# Patient Record
Sex: FEMALE | Race: WHITE | NOT HISPANIC OR LATINO | Employment: FULL TIME | URBAN - METROPOLITAN AREA
[De-identification: names, ages, dates, MRNs, and addresses within clinical notes are randomized per-mention and may not be internally consistent; named-entity substitution may affect disease eponyms.]

---

## 2017-10-01 ENCOUNTER — TRANSFERRED RECORDS (OUTPATIENT)
Dept: HEALTH INFORMATION MANAGEMENT | Facility: CLINIC | Age: 32
End: 2017-10-01

## 2017-10-27 ENCOUNTER — TRANSFERRED RECORDS (OUTPATIENT)
Dept: HEALTH INFORMATION MANAGEMENT | Facility: CLINIC | Age: 32
End: 2017-10-27

## 2017-10-27 LAB — PAP SMEAR - HIM PATIENT REPORTED: NEGATIVE

## 2018-05-24 ENCOUNTER — TRANSFERRED RECORDS (OUTPATIENT)
Dept: HEALTH INFORMATION MANAGEMENT | Facility: CLINIC | Age: 33
End: 2018-05-24

## 2018-05-24 LAB
HBV SURFACE AG SERPL QL IA: NORMAL
RUBELLA ABY IGG: 7.71
RUBELLA ANTIBODY IGG QUANTITATIVE: NORMAL IU/ML

## 2018-06-21 ENCOUNTER — TRANSFERRED RECORDS (OUTPATIENT)
Dept: HEALTH INFORMATION MANAGEMENT | Facility: CLINIC | Age: 33
End: 2018-06-21

## 2018-07-20 ENCOUNTER — PRE VISIT (OUTPATIENT)
Dept: MATERNAL FETAL MEDICINE | Facility: CLINIC | Age: 33
End: 2018-07-20

## 2018-07-27 ENCOUNTER — OFFICE VISIT (OUTPATIENT)
Dept: MATERNAL FETAL MEDICINE | Facility: CLINIC | Age: 33
End: 2018-07-27
Attending: OBSTETRICS & GYNECOLOGY
Payer: COMMERCIAL

## 2018-07-27 ENCOUNTER — HOSPITAL ENCOUNTER (OUTPATIENT)
Dept: ULTRASOUND IMAGING | Facility: CLINIC | Age: 33
Discharge: HOME | End: 2018-07-27
Attending: OBSTETRICS & GYNECOLOGY | Admitting: OBSTETRICS & GYNECOLOGY
Payer: COMMERCIAL

## 2018-07-27 DIAGNOSIS — O26.90 PREGNANCY RELATED CONDITION, ANTEPARTUM: ICD-10-CM

## 2018-07-27 DIAGNOSIS — O09.522 ELDERLY MULTIGRAVIDA IN SECOND TRIMESTER: Primary | ICD-10-CM

## 2018-07-27 DIAGNOSIS — O09.522 ADVANCED MATERNAL AGE IN MULTIGRAVIDA, SECOND TRIMESTER: Primary | ICD-10-CM

## 2018-07-27 PROCEDURE — 96040 ZZH GENETIC COUNSELING, EACH 30 MINUTES: CPT | Mod: ZF | Performed by: GENETIC COUNSELOR, MS

## 2018-07-27 PROCEDURE — 76811 OB US DETAILED SNGL FETUS: CPT

## 2018-07-27 NOTE — PROGRESS NOTES
"Please see \"Imaging\" tab under \"Chart Review\" for details of today's US at the [facility].    Joao Monaco MD  Maternal-Fetal Medicine    "

## 2018-07-27 NOTE — PROGRESS NOTES
[facility]  Genetic Counseling Consult    Patient:  Maryam Grier YOB: 1985   Date of Service: 2018 Referring Provider:  Dr. Laura Tatum     Maryam Grier was seen at the [facility] for genetic counseling consultation as part of her appointment for comprehensive ultrasound.  The indication for genetic counseling was advanced maternal age.  She was accompanied to clinic by her  and her 8 year old and 5 year old sons.       Summary/Plan:     1.   At age 33 at delivery, the risk for a woman to have a baby at birth with a chromosome problem is about 0.5%. Maryam had a normal NIPT analysis earlier in pregnancy through her local prenatal care provider's office. This normal result would be expected to significantly reduce the risk of a chromosome problem in this pregnancy.    2.  Prior to today's ultrasound, I met with Maryam to review pregnancy and family history.   We also reviewed her previous screening/testing results and talked about what additional information might be provided by today's ultrasound.  See details below.    3.  After our conversation, Maryam had a comprehensive (level II) ultrasound today.  Please see the ultrasound report for further details.    4.  Maryam reports that she is not interested in in invasive prenatal testing for fetal chromosome abnormalities at this time.    Pregnancy History:   /Parity:    Age at Delivery: 33 year old  MARYAM: 2018, by Ultrasound  Gestational Age: 18w5d    Maryam reports that she takes prenatal vitamins and Synthroid (for hypothyroidism).      Per her prenatal record, Maryam had a 9 week ultrasound that showed fetal growth 6 days behind was was predicted by her LMP date.  Maryam reports that her primary prenatal care provider has decided to use this ultrasound-based MARYAM for her pregnancy.       Maryam reports no complications with her two previous  "pregnancies.    Family History:     A four-generation pedigree was obtained and will be scanned in under the  Media  tab in EPIC. The following significant findings were reported by Maryam and her :      Family history of learning differences:  Maryam and her  report that both of their sons have Individualized Education Programs (IEPs) for school.  They report that their oldest son has been diagnosed with a \"high functioning\" autism spectrum disorder.  They report that their younger son reeives PT, OT, and speech therapy services but that his underlying learning/developmental differences have not been specifically categorized.  Maryam reports that they had been offered genetic testing/genetic evaluation previously for their sons, but she reports that they declined to pursue genetic assessment because she said \"they didn't see what difference it would make.\"  We very briefly talked about some of the reasons information from genetic assessment can be helpful, including offering potential anticipatory guidance if a specific diagnosis is made.  Maryam said they might think about pursuing a genetics assessment in the future.    We talked about that this family history likely increases the risk for similar learning/developmental issues in this coming child.  We did talk about that this family history would be important for their children's health care providers/teachers to be aware of, so that appropriate screening and monitoring can be done for potential learning issues.  (Maryam and her  stated that they were already aware of this information.)      Family history of skin defect:  Maryam and her  report that their oldest son had a \"pinhole\" on his back note after birth but that spinal imaging showed no abnormalities.  They report that this did not require any treatment, and they were not aware of a genetic concern regarding this finding.    Family history of cancer:  Maryam reports that she has a " maternal aunt that had breast cancer in her 30s.  She report that her maternal grandfather  of a brain tumor.  In addition, Maryam reports that her father had a benign stomach tumor removed and that she has a paternal aunt with mouth cancer (past history of smoking).  We talked about that some families with a history of early onset cancer have a hereditary risk factor in the family that is increasing the risk of some cancers in the family.  We talked about that there are family cancer clinics available for individuals who want more specific genetic counseling about their family history of cancer and about additional screening and testing recommendations that might be made based on this history.  She reports that she is not aware of anybody in the family having genetic counseling or genetic testing with respect to this issue.      Maryam's partner's family history of melanoma:  Maryam's  reports that his mother has had melanoma twice and that his maternal grandfather also had melanoma twice.  We talked about that there are some genetic risk factors that can be associated with an increased incidence of melanoma in some famiies, but Maryam's  was not aware of any specific genetic risk factor being identified related to this history.  I strongly recommended that Maryam's  have a regular (at least yearly) skin exams through his primary doctor or a dermatologist because of this family history.     Otherwise, the reported family history is negative for multiple miscarriages, stillbirths, birth defects, known genetic conditions, and consanguinity.       Carrier Screening:       Maryam and her  both report that they are of  ancestry.  Cystic fibrosis is an autosomal recessive genetic condition that occurs with increased frequency in individuals of  ancestry and carrier screening for this condition is available.  In addition,  screening in the [facility] includes cystic  fibrosis.    In addition to ethnic-based carrier screening, expanded carrier screening for mutations in a large panel of genes associated with autosomal recessive conditions (including cystic fibrosis, spinal muscular atrophy, and others) and X-linked recessive conditions (like Fragile X syndrome) is now available      Carrier screening was not discussed in detail today.  If  Maryam decides that she would like to have cystic fibrosis or other carrier screening in the future, I would be happy to help facilitate this, if needed.       Risk Assessment for Chromosome Conditions:       We talked about that the risk for fetal chromosome abnormalities increases with maternal age. We briefly discussed specific features of common chromosome abnormalities, including Down syndrome, trisomy 13, trisomy 18, and sex chromosome trisomies.      At age 33 at delivery, the risk to have a baby with any chromosome abnormality at birth is about 1 in 202.   (At age 32 at midtrimester, the risk to have a baby with any chromosome abnormality at midtrimester is about 1 in  172.)      Maryam had maternal serum-based screening earlier in pregnancy.  See below for summary of her test results:     Non-invasive Prenatal Testing (NIPT)  - This test involves measurement of cell-free DNA testing, which is of both maternal and placental/fetal origin.  - This test screens for fetal trisomy 21, trisomy 13, trisomy 18, and sex chromosome aneuploidy.  - While this test is thought to be highly specific/sensitive with respect to screening for trisomy 21, trisomy 13, and trisomy 18, rare false positives and false negatives do occur. There is still limited data about the exact sensitivity/specificity of this test with respect to screening for sex chromosome aneuploidy.    Maryam had a InformaSeq test earlier in pregnancy; we reviewed the results today, which are normal for chromosome 13, chromosome 18 and chromosome 21 (no aneuploidy detected).  It was also  screen negative for fetal sex chromosome abnormalities.    First trimester ultrasound with nuchal translucency and nasal bone assessment was not performed in this pregnancy, to our knowledge.     We talked about that these normal results for chromosomes 21, 13, and 18 likely very significantly reduces the chance of Down syndrome, Trisomy 13, and Trisomy 18 in this pregnancy. However, we talked about that there are rare false negatives that can occur with this test.    The results were reported to be consistent with a female (XX) fetal gender. Maryam reports being aware of this gender information.  - This test cannot screen for open neural tube defects, and so consideration of maternal serum AFP 15 weeks or later is recommended.       Testing Options:       We reviewed the benefits and limitations of screening and diagnostic tests:    - We talked about that screening tests provide a risk assessment specific to the pregnancy for certain fetal chromosome abnormalities, but cannot definitively diagnose or exclude a fetal chromosome abnormality. Follow-up genetic counseling and consideration of diagnostic testing is recommended with any abnormal screening result.     - We discussed that diagnostic tests are associated with a risk of miscarriage. These tests can detect fetal chromosome abnormalities with greater than 99% certainty. Results can rarely be complicated by maternal cell contamination or mosaicism and are limited by the resolution of cytogenetic G-banding technology.     -There is no screening nor diagnostic test that can detect all forms of birth defects or mental disability.      We discussed the following screening and testing options:     Genetic Amniocentesis  - This is an invasive procedure typically performed at 15 weeks or later, through which amniotic fluid is obtained for the purpose of chromosome analysis and/or other prenatal genetic analysis.  - Amniocentesis is considered a diagnostic test for  chromosome problems during pregnancy.  - The risk of pregnancy loss associated with amniocentesis is generally estimated to be 1/500 or less.  - Amniotic fluid AFP measurement is also done to screen for the possibility of open neural tube or ventral defects.     Comprehensive (Level II) ultrasound  - This detailed ultrasound is usually performed between 18-22 weeks gestation to screen for major birth defects.  - It also screens for ultrasound markers that might increase the risk for a chromosome problem in a pregnancy.    Face-to-face time of the genetic counseling consult was 35 minutes.    Anne Vital MS, [facility]  Genetic Counselor  Ph: [phone]  Pager: [phone]

## 2018-07-27 NOTE — MR AVS SNAPSHOT
"              After Visit Summary   2018    Maryam Grier    MRN: [id number]           Patient Information     Date Of Birth          1985        Visit Information        Provider Department      2018 10:00 AM Joao Monaco MD [facility] Maternal Fetal Medicine [facility]        Today's Diagnoses     Elderly multigravida in second trimester    -  1       Follow-ups after your visit        Who to contact     If you have questions or need follow up information about today's clinic visit or your schedule please contact [facility] directly at [phone].  Normal or non-critical lab and imaging results will be communicated to you by [employer]art, letter or phone within 4 business days after the clinic has received the results. If you do not hear from us within 7 days, please contact the clinic through [employer] or phone. If you have a critical or abnormal lab result, we will notify you by phone as soon as possible.  Submit refill requests through [employer] or call your pharmacy and they will forward the refill request to us. Please allow 3 business days for your refill to be completed.          Additional Information About Your Visit        MyChart Information     [employer] lets you send messages to your doctor, view your test results, renew your prescriptions, schedule appointments and more. To sign up, go to [email or web address] . Click on \"Log in\" on the left side of the screen, which will take you to the Welcome page. Then click on \"Sign up Now\" on the right side of the page.     You will be asked to enter the access code listed below, as well as some personal information. Please follow the directions to create your username and password.     Your access code is: [email or web address]  Expires: 10/25/2018 10:07 AM     Your access code will  in 90 days. If you need help or a new code, please call your [place] clinic or [phone].        Care EveryWhere ID     This is your " Care EveryWhere ID. This could be used by other organizations to access your [place] medical records  [id number]        Your Vitals Were     Last Period                   03/12/2018            Blood Pressure from Last 3 Encounters:   No data found for BP    Weight from Last 3 Encounters:   No data found for Wt              Today, you had the following     No orders found for display       Primary Care Provider Fax #    Physician No Ref-Primary [phone]       No address on file        Equal Access to Services     MARIE DENIS : Hadii aad ku hadasho Soomaali, waaxda luqadaha, qaybta kaalmada adeegyada, waxay gustavoin isaacn mary bethevan cedillo laPiasachin . So [facility] [phone].    ATENCIÓN: Si habla español, tiene a landis disposición servicios gratuitos de asistencia lingüística. Llame al [phone].    We comply with applicable federal civil rights laws and [place] laws. We do not discriminate on the basis of race, color, national origin, age, disability, sex, sexual orientation, or gender identity.            Thank you!     Thank you for choosing [facility]  for your care. Our goal is always to provide you with excellent care. Hearing back from our patients is one way we can continue to improve our services. Please take a few minutes to complete the written survey that you may receive in the mail after your visit with us. Thank you!             Your Updated Medication List - Protect others around you: Learn how to safely use, store and throw away your medicines at www.disposemymeds.org.      Notice  As of 7/27/2018 10:07 AM    You have not been prescribed any medications.

## 2018-07-27 NOTE — MR AVS SNAPSHOT
"              After Visit Summary   2018    Maryam Grier    MRN: [id number]           Patient Information     Date Of Birth          1985        Visit Information        Provider Department      2018 8:45 AM Michelle Vital GC [facility] Maternal Fetal Medicine [facility]        Today's Diagnoses     Advanced maternal age in multigravida, second trimester    -  1    Pregnancy related condition, antepartum           Follow-ups after your visit        Who to contact     If you have questions or need follow up information about today's clinic visit or your schedule please contact [facility]ET[facility]IC[facility] directly at [phone].  Normal or non-critical lab and imaging results will be communicated to you by MyChart, letter or phone within 4 business days after the clinic has received the results. If you do not hear from us within 7 days, please contact the clinic through [employer] or phone. If you have a critical or abnormal lab result, we will notify you by phone as soon as possible.  Submit refill requests through [employer] or call your pharmacy and they will forward the refill request to us. Please allow 3 business days for your refill to be completed.          Additional Information About Your Visit        MyChart Information     [employer] lets you send messages to your doctor, view your test results, renew your prescriptions, schedule appointments and more. To sign up, go to [email or web address] . Click on \"Log in\" on the left side of the screen, which will take you to the Welcome page. Then click on \"Sign up Now\" on the right side of the page.     You will be asked to enter the access code listed below, as well as some personal information. Please follow the directions to create your username and password.     Your access code is: [email or web address]  Expires: 10/25/2018 10:07 AM     Your access code will  in 90 days. If you need help or a new code, please call your [place] clinic or " [phone].        Care EveryWhere ID     This is your Care EveryWhere ID. This could be used by other organizations to access your [place] medical records  [id number]        Your Vitals Were     Last Period                   03/12/2018            Blood Pressure from Last 3 Encounters:   No data found for BP    Weight from Last 3 Encounters:   No data found for Wt              We Performed the Following     [facility] Genetic Counseling        Primary Care Provider Fax #    Physician No Ref-Primary [phone]       No address on file        Equal Access to Services     TAMMY DENIS : Hadii aad ku hadasho Soomaali, waaxda luqadaha, qaybta kaalmada adeegyada, waxay idiin hayaan adeeg michaelshanon laoxana . So [facility] [phone].    ATENCIÓN: Si habla español, tiene a landis disposición servicios gratuitos de asistencia lingüística. Llame al [phone].    We comply with applicable federal civil rights laws and [place] laws. We do not discriminate on the basis of race, color, national origin, age, disability, sex, sexual orientation, or gender identity.            Thank you!     Thank you for choosing [facility]  for your care. Our goal is always to provide you with excellent care. Hearing back from our patients is one way we can continue to improve our services. Please take a few minutes to complete the written survey that you may receive in the mail after your visit with us. Thank you!             Your Updated Medication List - Protect others around you: Learn how to safely use, store and throw away your medicines at www.disposemymeds.org.      Notice  As of 7/27/2018 11:28 AM    You have not been prescribed any medications.

## 2018-11-06 LAB — GROUP B STREP PCR: NORMAL

## 2018-12-03 ENCOUNTER — HOSPITAL ENCOUNTER (OUTPATIENT)
Facility: CLINIC | Age: 33
Discharge: HOME | End: 2018-12-03
Attending: OBSTETRICS & GYNECOLOGY | Admitting: OBSTETRICS & GYNECOLOGY
Payer: COMMERCIAL

## 2018-12-03 VITALS
HEIGHT: 66 IN | RESPIRATION RATE: 18 BRPM | SYSTOLIC BLOOD PRESSURE: 132 MMHG | TEMPERATURE: 98.4 F | DIASTOLIC BLOOD PRESSURE: 77 MMHG

## 2018-12-03 PROCEDURE — G0463 HOSPITAL OUTPT CLINIC VISIT: HCPCS

## 2018-12-03 RX ORDER — LIDOCAINE 40 MG/G
CREAM TOPICAL
Status: DISCONTINUED | OUTPATIENT
Start: 2018-12-03 | End: 2018-12-03 | Stop reason: HOSPADM

## 2018-12-03 NOTE — PROGRESS NOTES
Patient presented for external cephalic version for breech presentation. Bedside ultrasound prior to procedure revealed cephalic presentation, occiput anterior. Reactive NST. Discharged home.     Laura Tatum MD

## 2018-12-03 NOTE — IP AVS SNAPSHOT
MRN:[id number]                      After Visit Summary   12/3/2018    Maryam Grier    MRN: [id number]           Thank you!     Thank you for choosing [facility] [place] for your care. Our goal is always to provide you with excellent care. Hearing back from our patients is one way we can continue to improve our services. Please take a few minutes to complete the written survey that you may receive in the mail after you visit. If you would like to speak to someone directly about your visit please contact Patient Relations at [phone]. Thank you!          Patient Information     Date Of Birth          1985        About your hospital stay     You were admitted on:  December 3, 2018 You last received care in the:  [facility] Labor and Delivery    You were discharged on:  December 3, 2018       Who to Call     For medical emergencies, please call 911.  For non-urgent questions about your medical care, please call your primary care provider or clinic, None          Attending Provider     Provider Specialty    Laura Tatum MD OB/Gyn       Primary Care Provider Fax #    Physician No Ref-Primary [phone]      Further instructions from your care team       Discharge Instruction for Undelivered Patients      You were seen for: External version  We Consulted: Dr. Tatum    You had (Test or Medicine):fetal monitoring and ultrasound for fetal position.  Baby vertex presentation. External version not needed.     Diet:   Drink 8 to 12 glasses of liquids (milk, juice, water) every day.  You may eat meals and snacks.     Activity:  Call your doctor or nurse midwife if your baby is moving less than usual.     Call your provider if you notice:  Swelling in your face or increased swelling in your hands or legs.  Headaches that are not relieved by Tylenol (acetaminophen).  Changes in your vision (blurring: seeing spots or stars.)  Nausea (sick to your stomach) and vomiting  "(throwing up).   Weight gain of 5 pounds or more per week.  Heartburn that doesn't go away.  Signs of bladder infection: pain when you urinate (use the toilet), need to go more often and more urgently.  The bag of rutherford (rupture of membranes) breaks, or you notice leaking in your underwear.  Bright red blood in your underwear.  Abdominal (lower belly) or stomach pain.  For first baby: Contractions (tightening) less than 5 minutes apart for one hour or more.  Second (plus) baby: Contractions (tightening) less than 10 minutes apart and getting stronger.  *If less than 34 weeks: Contractions (tightenings) more than 6 times in one hour.  Increase or change in vaginal discharge (note the color and amount)      Follow-up:  As scheduled in the clinic          Pending Results     No orders found from 2018 to 2018.            Admission Information     Date & Time Provider Department Dept. Phone    12/3/2018 Laura Tatum MD [facility] Labor and Delivery [phone]      Your Vitals Were     Blood Pressure Temperature Respirations Last Period          132/77 98.4  F (36.9  C) (Oral) 18 2018        MyChart Information     [employer] lets you send messages to your doctor, view your test results, renew your prescriptions, schedule appointments and more. To sign up, go to [email or web address] . Click on \"Log in\" on the left side of the screen, which will take you to the Welcome page. Then click on \"Sign up Now\" on the right side of the page.     You will be asked to enter the access code listed below, as well as some personal information. Please follow the directions to create your username and password.     Your access code is: [email or web address]  Expires: 3/3/2019  8:02 AM     Your access code will  in 90 days. If you need help or a new code, please call your [place] clinic or [phone].        Care EveryWhere ID     This is your Care EveryWhere ID. This could be used by other " organizations to access your [place] medical records  [id number]        Equal Access to Services     TAMMY DENIS : Azael Otero, aletha rudd, glenis izaguirre. So [facility] [phone].    ATENCIÓN: Si habla español, tiene a landis disposición servicios gratuitos de asistencia lingüística. Llame al [phone].    We comply with applicable federal civil rights laws and [place] laws. We do not discriminate on the basis of race, color, national origin, age, disability, sex, sexual orientation, or gender identity.               Review of your medicines      Notice     You have not been prescribed any medications.             Protect others around you: Learn how to safely use, store and throw away your medicines at www.disposemymeds.org.             Medication List: This is a list of all your medications and when to take them. Check marks below indicate your daily home schedule. Keep this list as a reference.      Notice     You have not been prescribed any medications.

## 2018-12-03 NOTE — PROVIDER NOTIFICATION
18 0750   Provider Notification   Provider Name/Title Dr. Tatum   Method of Notification At Bedside   Request Evaluate in Person   Notification Reason Status Update;Other (Comment)   , 37 1/7 weeks gestation. Here for external version. Monitors explained and applied. Denies bleeding or leaking of fluid. 0750- Dr. Tatum at bedside. US done. Vertex presentation. Order received for discharge. Patient to follow up at clinic.

## 2018-12-03 NOTE — DISCHARGE INSTRUCTIONS
Discharge Instruction for Undelivered Patients      You were seen for: External version  We Consulted: Dr. Tatum    You had (Test or Medicine):fetal monitoring and ultrasound for fetal position.  Baby vertex presentation. External version not needed.     Diet:   Drink 8 to 12 glasses of liquids (milk, juice, water) every day.  You may eat meals and snacks.     Activity:  Call your doctor or nurse midwife if your baby is moving less than usual.     Call your provider if you notice:  Swelling in your face or increased swelling in your hands or legs.  Headaches that are not relieved by Tylenol (acetaminophen).  Changes in your vision (blurring: seeing spots or stars.)  Nausea (sick to your stomach) and vomiting (throwing up).   Weight gain of 5 pounds or more per week.  Heartburn that doesn't go away.  Signs of bladder infection: pain when you urinate (use the toilet), need to go more often and more urgently.  The bag of rutherford (rupture of membranes) breaks, or you notice leaking in your underwear.  Bright red blood in your underwear.  Abdominal (lower belly) or stomach pain.  For first baby: Contractions (tightening) less than 5 minutes apart for one hour or more.  Second (plus) baby: Contractions (tightening) less than 10 minutes apart and getting stronger.  *If less than 34 weeks: Contractions (tightenings) more than 6 times in one hour.  Increase or change in vaginal discharge (note the color and amount)      Follow-up:  As scheduled in the clinic

## 2018-12-03 NOTE — IP AVS SNAPSHOT
[facility]    [address]    [facility] [address]    Phone:  [phone]    Fax:  [phone]                                       After Visit Summary   12/3/2018    Maryam Grier    MRN: [id number]           After Visit Summary Signature Page     I have received my discharge instructions, and my questions have been answered. I have discussed any challenges I see with this plan with the nurse or doctor.    ..........................................................................................................................................  Patient/Patient Representative Signature      ..........................................................................................................................................  Patient Representative Print Name and Relationship to Patient    ..................................................               ................................................  Date                                   Time    ..........................................................................................................................................  Reviewed by Signature/Title    ...................................................              ..............................................  Date                                               Time          22EPIC Rev 08/18

## 2018-12-19 ENCOUNTER — HOSPITAL ENCOUNTER (OUTPATIENT)
Facility: CLINIC | Age: 33
End: 2018-12-19
Admitting: OBSTETRICS & GYNECOLOGY
Payer: COMMERCIAL

## 2018-12-28 ENCOUNTER — ANESTHESIA EVENT (OUTPATIENT)
Dept: OBGYN | Facility: CLINIC | Age: 33
End: 2018-12-28
Payer: COMMERCIAL

## 2018-12-28 ENCOUNTER — HOSPITAL ENCOUNTER (INPATIENT)
Facility: CLINIC | Age: 33
LOS: 4 days | Discharge: HOME HEALTH | End: 2019-01-02
Attending: OBSTETRICS & GYNECOLOGY | Admitting: OBSTETRICS & GYNECOLOGY
Payer: COMMERCIAL

## 2018-12-28 ENCOUNTER — ANESTHESIA (OUTPATIENT)
Dept: OBGYN | Facility: CLINIC | Age: 33
End: 2018-12-28
Payer: COMMERCIAL

## 2018-12-28 DIAGNOSIS — Z98.891 S/P PRIMARY LOW TRANSVERSE C-SECTION: Primary | ICD-10-CM

## 2018-12-28 LAB — T PALLIDUM AB SER QL: NONREACTIVE

## 2018-12-28 PROCEDURE — 27110038 ZZH RX 271: Performed by: ANESTHESIOLOGY

## 2018-12-28 PROCEDURE — 86901 BLOOD TYPING SEROLOGIC RH(D): CPT | Performed by: OBSTETRICS & GYNECOLOGY

## 2018-12-28 PROCEDURE — G0463 HOSPITAL OUTPT CLINIC VISIT: HCPCS | Mod: 25

## 2018-12-28 PROCEDURE — 25000128 H RX IP 250 OP 636: Performed by: ANESTHESIOLOGY

## 2018-12-28 PROCEDURE — 3E0R3BZ INTRODUCTION OF ANESTHETIC AGENT INTO SPINAL CANAL, PERCUTANEOUS APPROACH: ICD-10-PCS | Performed by: ANESTHESIOLOGY

## 2018-12-28 PROCEDURE — 86900 BLOOD TYPING SEROLOGIC ABO: CPT | Performed by: OBSTETRICS & GYNECOLOGY

## 2018-12-28 PROCEDURE — 4A1H7CZ MONITORING OF PRODUCTS OF CONCEPTION, CARDIAC RATE, VIA NATURAL OR ARTIFICIAL OPENING: ICD-10-PCS | Performed by: OBSTETRICS & GYNECOLOGY

## 2018-12-28 PROCEDURE — 10H073Z INSERTION OF MONITORING ELECTRODE INTO PRODUCTS OF CONCEPTION, VIA NATURAL OR ARTIFICIAL OPENING: ICD-10-PCS | Performed by: OBSTETRICS & GYNECOLOGY

## 2018-12-28 PROCEDURE — 00HU33Z INSERTION OF INFUSION DEVICE INTO SPINAL CANAL, PERCUTANEOUS APPROACH: ICD-10-PCS | Performed by: ANESTHESIOLOGY

## 2018-12-28 PROCEDURE — 37000011 ZZH ANESTHESIA WARD SERVICE

## 2018-12-28 PROCEDURE — 25000128 H RX IP 250 OP 636: Performed by: OBSTETRICS & GYNECOLOGY

## 2018-12-28 PROCEDURE — 25000125 ZZHC RX 250: Performed by: OBSTETRICS & GYNECOLOGY

## 2018-12-28 PROCEDURE — 86850 RBC ANTIBODY SCREEN: CPT | Performed by: OBSTETRICS & GYNECOLOGY

## 2018-12-28 PROCEDURE — 86780 TREPONEMA PALLIDUM: CPT | Performed by: OBSTETRICS & GYNECOLOGY

## 2018-12-28 PROCEDURE — 25000132 ZZH RX MED GY IP 250 OP 250 PS 637: Performed by: OBSTETRICS & GYNECOLOGY

## 2018-12-28 PROCEDURE — 25000125 ZZHC RX 250: Performed by: ANESTHESIOLOGY

## 2018-12-28 PROCEDURE — 10H07YZ INSERTION OF OTHER DEVICE INTO PRODUCTS OF CONCEPTION, VIA NATURAL OR ARTIFICIAL OPENING: ICD-10-PCS | Performed by: OBSTETRICS & GYNECOLOGY

## 2018-12-28 PROCEDURE — 59025 FETAL NON-STRESS TEST: CPT

## 2018-12-28 PROCEDURE — 36415 COLL VENOUS BLD VENIPUNCTURE: CPT | Performed by: OBSTETRICS & GYNECOLOGY

## 2018-12-28 RX ORDER — CARBOPROST TROMETHAMINE 250 UG/ML
250 INJECTION, SOLUTION INTRAMUSCULAR
Status: DISCONTINUED | OUTPATIENT
Start: 2018-12-28 | End: 2018-12-29

## 2018-12-28 RX ORDER — OXYTOCIN/0.9 % SODIUM CHLORIDE 30/500 ML
1-24 PLASTIC BAG, INJECTION (ML) INTRAVENOUS CONTINUOUS
Status: DISCONTINUED | OUTPATIENT
Start: 2018-12-28 | End: 2018-12-29

## 2018-12-28 RX ORDER — SODIUM CHLORIDE, SODIUM LACTATE, POTASSIUM CHLORIDE, CALCIUM CHLORIDE 600; 310; 30; 20 MG/100ML; MG/100ML; MG/100ML; MG/100ML
INJECTION, SOLUTION INTRAVENOUS CONTINUOUS
Status: DISCONTINUED | OUTPATIENT
Start: 2018-12-28 | End: 2018-12-29

## 2018-12-28 RX ORDER — LIDOCAINE HYDROCHLORIDE AND EPINEPHRINE 15; 5 MG/ML; UG/ML
INJECTION, SOLUTION EPIDURAL PRN
Status: DISCONTINUED | OUTPATIENT
Start: 2018-12-28 | End: 2018-12-29 | Stop reason: HOSPADM

## 2018-12-28 RX ORDER — ACETAMINOPHEN 325 MG/1
650 TABLET ORAL EVERY 4 HOURS PRN
Status: DISCONTINUED | OUTPATIENT
Start: 2018-12-28 | End: 2018-12-29

## 2018-12-28 RX ORDER — METHYLERGONOVINE MALEATE 0.2 MG/ML
200 INJECTION INTRAVENOUS
Status: DISCONTINUED | OUTPATIENT
Start: 2018-12-28 | End: 2018-12-29

## 2018-12-28 RX ORDER — EPHEDRINE SULFATE 50 MG/ML
5 INJECTION, SOLUTION INTRAMUSCULAR; INTRAVENOUS; SUBCUTANEOUS
Status: DISCONTINUED | OUTPATIENT
Start: 2018-12-28 | End: 2018-12-29

## 2018-12-28 RX ORDER — NALOXONE HYDROCHLORIDE 0.4 MG/ML
.1-.4 INJECTION, SOLUTION INTRAMUSCULAR; INTRAVENOUS; SUBCUTANEOUS
Status: DISCONTINUED | OUTPATIENT
Start: 2018-12-28 | End: 2018-12-29

## 2018-12-28 RX ORDER — MISOPROSTOL 100 UG/1
25 TABLET ORAL
Status: DISCONTINUED | OUTPATIENT
Start: 2018-12-28 | End: 2018-12-29

## 2018-12-28 RX ORDER — ONDANSETRON 2 MG/ML
4 INJECTION INTRAMUSCULAR; INTRAVENOUS EVERY 6 HOURS PRN
Status: DISCONTINUED | OUTPATIENT
Start: 2018-12-28 | End: 2018-12-29

## 2018-12-28 RX ORDER — LIDOCAINE 40 MG/G
CREAM TOPICAL
Status: DISCONTINUED | OUTPATIENT
Start: 2018-12-28 | End: 2018-12-29

## 2018-12-28 RX ORDER — FENTANYL CITRATE 50 UG/ML
100 INJECTION, SOLUTION INTRAMUSCULAR; INTRAVENOUS ONCE
Status: COMPLETED | OUTPATIENT
Start: 2018-12-28 | End: 2018-12-28

## 2018-12-28 RX ORDER — OXYTOCIN 10 [USP'U]/ML
10 INJECTION, SOLUTION INTRAMUSCULAR; INTRAVENOUS
Status: DISCONTINUED | OUTPATIENT
Start: 2018-12-28 | End: 2018-12-29

## 2018-12-28 RX ORDER — NALBUPHINE HYDROCHLORIDE 10 MG/ML
2.5-5 INJECTION, SOLUTION INTRAMUSCULAR; INTRAVENOUS; SUBCUTANEOUS EVERY 6 HOURS PRN
Status: DISCONTINUED | OUTPATIENT
Start: 2018-12-28 | End: 2018-12-29

## 2018-12-28 RX ORDER — IBUPROFEN 400 MG/1
800 TABLET, FILM COATED ORAL
Status: DISCONTINUED | OUTPATIENT
Start: 2018-12-28 | End: 2018-12-29

## 2018-12-28 RX ORDER — ROPIVACAINE HYDROCHLORIDE 2 MG/ML
10 INJECTION, SOLUTION EPIDURAL; INFILTRATION; PERINEURAL ONCE
Status: COMPLETED | OUTPATIENT
Start: 2018-12-28 | End: 2018-12-28

## 2018-12-28 RX ORDER — OXYTOCIN/0.9 % SODIUM CHLORIDE 30/500 ML
100-340 PLASTIC BAG, INJECTION (ML) INTRAVENOUS CONTINUOUS PRN
Status: DISCONTINUED | OUTPATIENT
Start: 2018-12-28 | End: 2018-12-29

## 2018-12-28 RX ORDER — PENICILLIN G POTASSIUM 5000000 [IU]/1
5 INJECTION, POWDER, FOR SOLUTION INTRAMUSCULAR; INTRAVENOUS ONCE
Status: COMPLETED | OUTPATIENT
Start: 2018-12-28 | End: 2018-12-28

## 2018-12-28 RX ORDER — OXYCODONE AND ACETAMINOPHEN 5; 325 MG/1; MG/1
1 TABLET ORAL
Status: DISCONTINUED | OUTPATIENT
Start: 2018-12-28 | End: 2018-12-29

## 2018-12-28 RX ORDER — LIDOCAINE HYDROCHLORIDE AND EPINEPHRINE 15; 5 MG/ML; UG/ML
INJECTION, SOLUTION EPIDURAL PRN
Status: DISCONTINUED | OUTPATIENT
Start: 2018-12-28 | End: 2018-12-28

## 2018-12-28 RX ADMIN — Medication 12 ML/HR: at 21:52

## 2018-12-28 RX ADMIN — SODIUM CHLORIDE 2.5 MILLION UNITS: 9 INJECTION, SOLUTION INTRAVENOUS at 14:30

## 2018-12-28 RX ADMIN — LIDOCAINE HYDROCHLORIDE,EPINEPHRINE BITARTRATE 3 ML: 15; .005 INJECTION, SOLUTION EPIDURAL; INFILTRATION; INTRACAUDAL; PERINEURAL at 21:45

## 2018-12-28 RX ADMIN — SODIUM CHLORIDE, POTASSIUM CHLORIDE, SODIUM LACTATE AND CALCIUM CHLORIDE: 600; 310; 30; 20 INJECTION, SOLUTION INTRAVENOUS at 21:54

## 2018-12-28 RX ADMIN — SODIUM CHLORIDE, POTASSIUM CHLORIDE, SODIUM LACTATE AND CALCIUM CHLORIDE 500 ML: 600; 310; 30; 20 INJECTION, SOLUTION INTRAVENOUS at 23:22

## 2018-12-28 RX ADMIN — SODIUM CHLORIDE, POTASSIUM CHLORIDE, SODIUM LACTATE AND CALCIUM CHLORIDE: 600; 310; 30; 20 INJECTION, SOLUTION INTRAVENOUS at 09:33

## 2018-12-28 RX ADMIN — PENICILLIN G POTASSIUM 5 MILLION UNITS: 5000000 POWDER, FOR SOLUTION INTRAMUSCULAR; INTRAPLEURAL; INTRATHECAL; INTRAVENOUS at 10:31

## 2018-12-28 RX ADMIN — SODIUM CHLORIDE 2.5 MILLION UNITS: 9 INJECTION, SOLUTION INTRAVENOUS at 18:13

## 2018-12-28 RX ADMIN — MISOPROSTOL 25 MCG: 100 TABLET ORAL at 11:18

## 2018-12-28 RX ADMIN — SODIUM CHLORIDE 2.5 MILLION UNITS: 9 INJECTION, SOLUTION INTRAVENOUS at 22:22

## 2018-12-28 RX ADMIN — SODIUM CHLORIDE, POTASSIUM CHLORIDE, SODIUM LACTATE AND CALCIUM CHLORIDE: 600; 310; 30; 20 INJECTION, SOLUTION INTRAVENOUS at 20:32

## 2018-12-28 RX ADMIN — OXYTOCIN-SODIUM CHLORIDE 0.9% IV SOLN 30 UNIT/500ML 1 MILLI-UNITS/MIN: 30-0.9/5 SOLUTION at 13:56

## 2018-12-28 RX ADMIN — ROPIVACAINE HYDROCHLORIDE 9 ML: 2 INJECTION, SOLUTION EPIDURAL; INFILTRATION at 21:50

## 2018-12-28 RX ADMIN — FENTANYL CITRATE 100 MCG: 50 INJECTION, SOLUTION INTRAMUSCULAR; INTRAVENOUS at 21:50

## 2018-12-28 RX ADMIN — OXYTOCIN-SODIUM CHLORIDE 0.9% IV SOLN 30 UNIT/500ML 8 MILLI-UNITS/MIN: 30-0.9/5 SOLUTION at 23:55

## 2018-12-28 ASSESSMENT — ACTIVITIES OF DAILY LIVING (ADL)
BATHING: 0-->INDEPENDENT
AMBULATION: 0-->INDEPENDENT
COGNITION: 0 - NO COGNITION ISSUES REPORTED
SWALLOWING: 0-->SWALLOWS FOODS/LIQUIDS WITHOUT DIFFICULTY
FALL_HISTORY_WITHIN_LAST_SIX_MONTHS: NO
RETIRED_COMMUNICATION: 0-->UNDERSTANDS/COMMUNICATES WITHOUT DIFFICULTY
TOILETING: 0-->INDEPENDENT
TRANSFERRING: 0-->INDEPENDENT
DRESS: 0-->INDEPENDENT
RETIRED_EATING: 0-->INDEPENDENT

## 2018-12-28 ASSESSMENT — MIFFLIN-ST. JEOR: SCORE: 1981.82

## 2018-12-28 NOTE — PLAN OF CARE
Cervical exam  Patient is now 3 cms dilated.  Cytotec held.  Dr. Catrachita Tilley paged to give update and clarify orders for pitocin

## 2018-12-28 NOTE — PLAN OF CARE
Text paged update to Dr. Catrachita Tilley, request that she evaluate patient when she is able.  Unable to place FSE, oxytocin at 2 mu.

## 2018-12-28 NOTE — PLAN OF CARE
Patient admitted to Maternal assessment center states that her water broke at 0500 and the fluid was green in color.  States that she feels baby move.  Was scheduled for an induction of labor on Sunday for unstable lie.  Monitors applied with patient's consent at 0715. History obtained and reviewed with patient.  Meconium fluid noted with vaginal exam, nurse unable to assess presenting part.  Requested MD to ultrasound to confirm vertex presentation.    0815 Dr. Houser in room, confirms vertex.  Plan is for oral cytotec.  Patient in agreement with plan.  Was positive for GBS in urine.  Will start antibiotics    0840  Monitors removed.  To room 213 ambulatory

## 2018-12-28 NOTE — PROGRESS NOTES
"2018      S: Pt comfortable.  RN attempted FECG placement due to difficulty keeping baby on monitor, however not tracing and could not remove.      O: /85   Pulse 81   Temp 97.3  F (36.3  C) (Oral)   Resp 18   Ht 1.676 m (5' 6\")   Wt 127 kg (280 lb)   LMP 2018   BMI 45.19 kg/m    Gen: NAD, A&O x 3  Abd: gravid, NT  SVE: 3.5/40/-3, posterior.  FECG palpated to be connected to cervix - successfully removed.  Attempt made to place new FECG, however also stuck to cervix, and removed.  FHT: cat 1 reactive  TOCO: irregular, pitocin @ 4      A/P: 34yo  @ 40.5 wga here for ROM. AVSS.  - s/p cytotec x 1 dose  - continue pitocin per protocol      PATTIE PEPPER MD    "

## 2018-12-28 NOTE — PLAN OF CARE
Dr. Catrachita Tilley returned call.  Fetal heart tone are in the 140s with accels, fetal heart tracing sketchy, needs to be readjusted frequently.  Order received to place FSE if able to obtain tracing.  Plan is to start oxytocin induction

## 2018-12-28 NOTE — H&P
"2018      32yo  @ 40.5 wga presents for PROM.  She states that at ~ 5:00 she began having LOF that was \"yellowish-greenish\" in color.  She denied any significant cramping or VB.  Upon arrival to [facility], she was found to be grossly ruptured with light mec stained fluid.  She was only ftp dilated.    Prenatal care has been complicated by hypothyroidism (on levothyroxine 100 mcg daily), and GBS bactiuria at 33 wga (treated).  She was scheduled for an ECV on 12/3 for breech, however upon arrival fetus was found to be cephalic.        PNLs: A pos, ABS, neg, RI, RPR NR, HIV NR, HBsAg neg, GBS POSITIVE      /85   Pulse 81   Temp 97.3  F (36.3  C) (Oral)   Resp 18   Ht 1.676 m (5' 6\")   Wt 127 kg (280 lb)   LMP 2018   BMI 45.19 kg/m    Gen: NAD, A&O x 3  Abd: gravid, NT  SVE: ftp/50/-3/posterior per RN  FHT: cat I reactive  TOCO irregular      Bedside US in MAC: vertex presentation.        A/P: 32yo  @ 40.5 wga here for ROM.  AVSS.  - she has an unfavorable cervix: plan for misoprostol PO for ripening, with eventual pitocin per protocol once more dilated  - vertex on BSUS today  - GBS POSITIVE: start PCN now per protocol  - EFW ~9 lb  - d/w pt pain mgmt options, including IV fentanyl, nitrous oxide, epidural risks/benefits.        PATTIE PEPPER MD    "

## 2018-12-29 ENCOUNTER — ANESTHESIA (OUTPATIENT)
Dept: OBGYN | Facility: CLINIC | Age: 33
End: 2018-12-29
Payer: COMMERCIAL

## 2018-12-29 ENCOUNTER — ANESTHESIA EVENT (OUTPATIENT)
Dept: OBGYN | Facility: CLINIC | Age: 33
End: 2018-12-29
Payer: COMMERCIAL

## 2018-12-29 PROBLEM — Z98.891 S/P PRIMARY LOW TRANSVERSE C-SECTION: Status: ACTIVE | Noted: 2018-12-29

## 2018-12-29 LAB
ABO + RH BLD: NORMAL
BLD GP AB SCN SERPL QL: NORMAL
BLOOD BANK CMNT PATIENT-IMP: NORMAL
SPECIMEN EXP DATE BLD: NORMAL
SPECIMEN EXP DATE BLD: NORMAL

## 2018-12-29 PROCEDURE — 25000128 H RX IP 250 OP 636

## 2018-12-29 PROCEDURE — 36000056 ZZH SURGERY LEVEL 3 1ST 30 MIN: Performed by: OBSTETRICS & GYNECOLOGY

## 2018-12-29 PROCEDURE — 25000125 ZZHC RX 250: Performed by: NURSE ANESTHETIST, CERTIFIED REGISTERED

## 2018-12-29 PROCEDURE — 71000012 ZZH RECOVERY PHASE 1 LEVEL 1 FIRST HR: Performed by: OBSTETRICS & GYNECOLOGY

## 2018-12-29 PROCEDURE — 88307 TISSUE EXAM BY PATHOLOGIST: CPT | Performed by: OBSTETRICS & GYNECOLOGY

## 2018-12-29 PROCEDURE — 25000128 H RX IP 250 OP 636: Performed by: OBSTETRICS & GYNECOLOGY

## 2018-12-29 PROCEDURE — 71000013 ZZH RECOVERY PHASE 1 LEVEL 1 EA ADDTL HR: Performed by: OBSTETRICS & GYNECOLOGY

## 2018-12-29 PROCEDURE — 25000128 H RX IP 250 OP 636: Performed by: NURSE ANESTHETIST, CERTIFIED REGISTERED

## 2018-12-29 PROCEDURE — 27210794 ZZH OR GENERAL SUPPLY STERILE: Performed by: OBSTETRICS & GYNECOLOGY

## 2018-12-29 PROCEDURE — 25000132 ZZH RX MED GY IP 250 OP 250 PS 637: Performed by: OBSTETRICS & GYNECOLOGY

## 2018-12-29 PROCEDURE — 36000058 ZZH SURGERY LEVEL 3 EA 15 ADDTL MIN: Performed by: OBSTETRICS & GYNECOLOGY

## 2018-12-29 PROCEDURE — 37000009 ZZH ANESTHESIA TECHNICAL FEE, EACH ADDTL 15 MIN: Performed by: OBSTETRICS & GYNECOLOGY

## 2018-12-29 PROCEDURE — 37000008 ZZH ANESTHESIA TECHNICAL FEE, 1ST 30 MIN: Performed by: OBSTETRICS & GYNECOLOGY

## 2018-12-29 PROCEDURE — 25000125 ZZHC RX 250: Performed by: OBSTETRICS & GYNECOLOGY

## 2018-12-29 PROCEDURE — 12000037 ZZH R&B POSTPARTUM INTERMEDIATE

## 2018-12-29 PROCEDURE — 88307 TISSUE EXAM BY PATHOLOGIST: CPT | Mod: 26 | Performed by: OBSTETRICS & GYNECOLOGY

## 2018-12-29 RX ORDER — OXYTOCIN/0.9 % SODIUM CHLORIDE 30/500 ML
PLASTIC BAG, INJECTION (ML) INTRAVENOUS CONTINUOUS PRN
Status: DISCONTINUED | OUTPATIENT
Start: 2018-12-29 | End: 2018-12-29

## 2018-12-29 RX ORDER — LIDOCAINE 40 MG/G
CREAM TOPICAL
Status: DISCONTINUED | OUTPATIENT
Start: 2018-12-29 | End: 2019-01-02 | Stop reason: HOSPADM

## 2018-12-29 RX ORDER — FENTANYL CITRATE 50 UG/ML
25-50 INJECTION, SOLUTION INTRAMUSCULAR; INTRAVENOUS
Status: DISCONTINUED | OUTPATIENT
Start: 2018-12-29 | End: 2019-01-02 | Stop reason: HOSPADM

## 2018-12-29 RX ORDER — ONDANSETRON 2 MG/ML
4 INJECTION INTRAMUSCULAR; INTRAVENOUS EVERY 30 MIN PRN
Status: DISCONTINUED | OUTPATIENT
Start: 2018-12-29 | End: 2018-12-29

## 2018-12-29 RX ORDER — OXYTOCIN/0.9 % SODIUM CHLORIDE 30/500 ML
100 PLASTIC BAG, INJECTION (ML) INTRAVENOUS CONTINUOUS
Status: DISCONTINUED | OUTPATIENT
Start: 2018-12-29 | End: 2019-01-02 | Stop reason: HOSPADM

## 2018-12-29 RX ORDER — CEFAZOLIN SODIUM IN 0.9 % NACL 3 G/100 ML
3 INTRAVENOUS SOLUTION, PIGGYBACK (ML) INTRAVENOUS
Status: COMPLETED | OUTPATIENT
Start: 2018-12-29 | End: 2018-12-29

## 2018-12-29 RX ORDER — NALBUPHINE HYDROCHLORIDE 10 MG/ML
2.5-5 INJECTION, SOLUTION INTRAMUSCULAR; INTRAVENOUS; SUBCUTANEOUS EVERY 6 HOURS PRN
Status: DISCONTINUED | OUTPATIENT
Start: 2018-12-29 | End: 2019-01-02 | Stop reason: HOSPADM

## 2018-12-29 RX ORDER — HYDROCORTISONE 2.5 %
CREAM (GRAM) TOPICAL 3 TIMES DAILY PRN
Status: DISCONTINUED | OUTPATIENT
Start: 2018-12-29 | End: 2019-01-02 | Stop reason: HOSPADM

## 2018-12-29 RX ORDER — KETOROLAC TROMETHAMINE 30 MG/ML
INJECTION, SOLUTION INTRAMUSCULAR; INTRAVENOUS
Status: COMPLETED
Start: 2018-12-29 | End: 2018-12-29

## 2018-12-29 RX ORDER — SODIUM CHLORIDE, SODIUM LACTATE, POTASSIUM CHLORIDE, CALCIUM CHLORIDE 600; 310; 30; 20 MG/100ML; MG/100ML; MG/100ML; MG/100ML
INJECTION, SOLUTION INTRAVENOUS CONTINUOUS
Status: DISCONTINUED | OUTPATIENT
Start: 2018-12-29 | End: 2019-01-02 | Stop reason: HOSPADM

## 2018-12-29 RX ORDER — SIMETHICONE 80 MG
80 TABLET,CHEWABLE ORAL 4 TIMES DAILY PRN
Status: DISCONTINUED | OUTPATIENT
Start: 2018-12-29 | End: 2019-01-02 | Stop reason: HOSPADM

## 2018-12-29 RX ORDER — AMOXICILLIN 250 MG
1 CAPSULE ORAL 2 TIMES DAILY PRN
Status: DISCONTINUED | OUTPATIENT
Start: 2018-12-29 | End: 2019-01-02 | Stop reason: HOSPADM

## 2018-12-29 RX ORDER — OXYCODONE HYDROCHLORIDE 5 MG/1
5-10 TABLET ORAL
Status: DISCONTINUED | OUTPATIENT
Start: 2018-12-29 | End: 2019-01-02 | Stop reason: HOSPADM

## 2018-12-29 RX ORDER — ACETAMINOPHEN 325 MG/1
975 TABLET ORAL EVERY 8 HOURS
Status: COMPLETED | OUTPATIENT
Start: 2018-12-29 | End: 2019-01-01

## 2018-12-29 RX ORDER — MORPHINE SULFATE 1 MG/ML
INJECTION, SOLUTION EPIDURAL; INTRATHECAL; INTRAVENOUS
Status: DISCONTINUED
Start: 2018-12-29 | End: 2018-12-29 | Stop reason: HOSPADM

## 2018-12-29 RX ORDER — CITRIC ACID/SODIUM CITRATE 334-500MG
30 SOLUTION, ORAL ORAL
Status: COMPLETED | OUTPATIENT
Start: 2018-12-29 | End: 2018-12-29

## 2018-12-29 RX ORDER — FENTANYL CITRATE 50 UG/ML
INJECTION, SOLUTION INTRAMUSCULAR; INTRAVENOUS
Status: DISCONTINUED
Start: 2018-12-29 | End: 2018-12-29 | Stop reason: HOSPADM

## 2018-12-29 RX ORDER — OXYTOCIN/0.9 % SODIUM CHLORIDE 30/500 ML
340 PLASTIC BAG, INJECTION (ML) INTRAVENOUS CONTINUOUS PRN
Status: DISCONTINUED | OUTPATIENT
Start: 2018-12-29 | End: 2019-01-02 | Stop reason: HOSPADM

## 2018-12-29 RX ORDER — NALOXONE HYDROCHLORIDE 0.4 MG/ML
.1-.4 INJECTION, SOLUTION INTRAMUSCULAR; INTRAVENOUS; SUBCUTANEOUS
Status: ACTIVE | OUTPATIENT
Start: 2018-12-29 | End: 2018-12-30

## 2018-12-29 RX ORDER — LANOLIN 100 %
OINTMENT (GRAM) TOPICAL
Status: DISCONTINUED | OUTPATIENT
Start: 2018-12-29 | End: 2019-01-02 | Stop reason: HOSPADM

## 2018-12-29 RX ORDER — NALOXONE HYDROCHLORIDE 0.4 MG/ML
.1-.4 INJECTION, SOLUTION INTRAMUSCULAR; INTRAVENOUS; SUBCUTANEOUS
Status: DISCONTINUED | OUTPATIENT
Start: 2018-12-29 | End: 2019-01-02 | Stop reason: HOSPADM

## 2018-12-29 RX ORDER — ONDANSETRON 2 MG/ML
INJECTION INTRAMUSCULAR; INTRAVENOUS
Status: COMPLETED
Start: 2018-12-29 | End: 2018-12-29

## 2018-12-29 RX ORDER — CEFAZOLIN SODIUM 1 G/3ML
1 INJECTION, POWDER, FOR SOLUTION INTRAMUSCULAR; INTRAVENOUS SEE ADMIN INSTRUCTIONS
Status: DISCONTINUED | OUTPATIENT
Start: 2018-12-29 | End: 2018-12-29

## 2018-12-29 RX ORDER — BISACODYL 10 MG
10 SUPPOSITORY, RECTAL RECTAL DAILY PRN
Status: DISCONTINUED | OUTPATIENT
Start: 2018-12-31 | End: 2019-01-02 | Stop reason: HOSPADM

## 2018-12-29 RX ORDER — ACETAMINOPHEN 325 MG/1
650 TABLET ORAL EVERY 4 HOURS PRN
Status: DISCONTINUED | OUTPATIENT
Start: 2019-01-01 | End: 2019-01-02 | Stop reason: HOSPADM

## 2018-12-29 RX ORDER — ONDANSETRON 2 MG/ML
4 INJECTION INTRAMUSCULAR; INTRAVENOUS EVERY 6 HOURS PRN
Status: DISCONTINUED | OUTPATIENT
Start: 2018-12-29 | End: 2019-01-02 | Stop reason: HOSPADM

## 2018-12-29 RX ORDER — ONDANSETRON 2 MG/ML
INJECTION INTRAMUSCULAR; INTRAVENOUS PRN
Status: DISCONTINUED | OUTPATIENT
Start: 2018-12-29 | End: 2018-12-29

## 2018-12-29 RX ORDER — AMOXICILLIN 250 MG
2 CAPSULE ORAL 2 TIMES DAILY PRN
Status: DISCONTINUED | OUTPATIENT
Start: 2018-12-29 | End: 2019-01-02 | Stop reason: HOSPADM

## 2018-12-29 RX ORDER — OXYTOCIN 10 [USP'U]/ML
10 INJECTION, SOLUTION INTRAMUSCULAR; INTRAVENOUS
Status: DISCONTINUED | OUTPATIENT
Start: 2018-12-29 | End: 2019-01-02 | Stop reason: HOSPADM

## 2018-12-29 RX ORDER — SODIUM CHLORIDE 9 MG/ML
INJECTION, SOLUTION INTRAVENOUS CONTINUOUS
Status: DISCONTINUED | OUTPATIENT
Start: 2018-12-29 | End: 2018-12-29

## 2018-12-29 RX ORDER — CEFAZOLIN SODIUM 1 G/3ML
INJECTION, POWDER, FOR SOLUTION INTRAMUSCULAR; INTRAVENOUS
Status: DISCONTINUED
Start: 2018-12-29 | End: 2018-12-29 | Stop reason: HOSPADM

## 2018-12-29 RX ORDER — LIDOCAINE HCL/EPINEPHRINE/PF 2%-1:200K
VIAL (ML) INJECTION PRN
Status: DISCONTINUED | OUTPATIENT
Start: 2018-12-29 | End: 2018-12-29

## 2018-12-29 RX ORDER — LIDOCAINE HCL/EPINEPHRINE/PF 2%-1:200K
VIAL (ML) INJECTION
Status: DISCONTINUED
Start: 2018-12-29 | End: 2018-12-29 | Stop reason: HOSPADM

## 2018-12-29 RX ORDER — EPHEDRINE SULFATE 50 MG/ML
5 INJECTION, SOLUTION INTRAMUSCULAR; INTRAVENOUS; SUBCUTANEOUS
Status: DISCONTINUED | OUTPATIENT
Start: 2018-12-29 | End: 2019-01-02 | Stop reason: HOSPADM

## 2018-12-29 RX ORDER — IBUPROFEN 400 MG/1
800 TABLET, FILM COATED ORAL EVERY 6 HOURS PRN
Status: DISCONTINUED | OUTPATIENT
Start: 2018-12-29 | End: 2019-01-02 | Stop reason: HOSPADM

## 2018-12-29 RX ORDER — ONDANSETRON 4 MG/1
4 TABLET, ORALLY DISINTEGRATING ORAL EVERY 30 MIN PRN
Status: DISCONTINUED | OUTPATIENT
Start: 2018-12-29 | End: 2018-12-29

## 2018-12-29 RX ORDER — FENTANYL CITRATE 50 UG/ML
INJECTION, SOLUTION INTRAMUSCULAR; INTRAVENOUS PRN
Status: DISCONTINUED | OUTPATIENT
Start: 2018-12-29 | End: 2018-12-29

## 2018-12-29 RX ORDER — OXYTOCIN/0.9 % SODIUM CHLORIDE 30/500 ML
PLASTIC BAG, INJECTION (ML) INTRAVENOUS
Status: DISCONTINUED
Start: 2018-12-29 | End: 2018-12-29 | Stop reason: HOSPADM

## 2018-12-29 RX ORDER — KETOROLAC TROMETHAMINE 30 MG/ML
30 INJECTION, SOLUTION INTRAMUSCULAR; INTRAVENOUS EVERY 6 HOURS
Status: COMPLETED | OUTPATIENT
Start: 2018-12-29 | End: 2018-12-30

## 2018-12-29 RX ORDER — LEVOTHYROXINE SODIUM 100 UG/1
100 TABLET ORAL DAILY
Status: DISCONTINUED | OUTPATIENT
Start: 2018-12-29 | End: 2019-01-02 | Stop reason: HOSPADM

## 2018-12-29 RX ORDER — DEXTROSE, SODIUM CHLORIDE, SODIUM LACTATE, POTASSIUM CHLORIDE, AND CALCIUM CHLORIDE 5; .6; .31; .03; .02 G/100ML; G/100ML; G/100ML; G/100ML; G/100ML
INJECTION, SOLUTION INTRAVENOUS CONTINUOUS
Status: DISCONTINUED | OUTPATIENT
Start: 2018-12-29 | End: 2019-01-02 | Stop reason: HOSPADM

## 2018-12-29 RX ADMIN — SODIUM CHLORIDE, SODIUM LACTATE, POTASSIUM CHLORIDE, CALCIUM CHLORIDE AND DEXTROSE MONOHYDRATE: 5; 600; 310; 30; 20 INJECTION, SOLUTION INTRAVENOUS at 22:36

## 2018-12-29 RX ADMIN — OXYTOCIN-SODIUM CHLORIDE 0.9% IV SOLN 30 UNIT/500ML 100 ML/HR: 30-0.9/5 SOLUTION at 10:12

## 2018-12-29 RX ADMIN — PHENYLEPHRINE HYDROCHLORIDE 200 MCG: 10 INJECTION, SOLUTION INTRAMUSCULAR; INTRAVENOUS; SUBCUTANEOUS at 05:47

## 2018-12-29 RX ADMIN — SODIUM CITRATE AND CITRIC ACID MONOHYDRATE 30 ML: 500; 334 SOLUTION ORAL at 05:32

## 2018-12-29 RX ADMIN — SODIUM CHLORIDE, POTASSIUM CHLORIDE, SODIUM LACTATE AND CALCIUM CHLORIDE: 600; 310; 30; 20 INJECTION, SOLUTION INTRAVENOUS at 05:36

## 2018-12-29 RX ADMIN — KETOROLAC TROMETHAMINE 30 MG: 30 INJECTION, SOLUTION INTRAMUSCULAR at 20:28

## 2018-12-29 RX ADMIN — ACETAMINOPHEN 975 MG: 325 TABLET, FILM COATED ORAL at 18:28

## 2018-12-29 RX ADMIN — Medication 340 ML/HR: at 05:53

## 2018-12-29 RX ADMIN — FENTANYL CITRATE 100 MCG: 50 INJECTION, SOLUTION INTRAMUSCULAR; INTRAVENOUS at 05:36

## 2018-12-29 RX ADMIN — SODIUM CHLORIDE 2.5 MILLION UNITS: 9 INJECTION, SOLUTION INTRAVENOUS at 02:25

## 2018-12-29 RX ADMIN — LEVOTHYROXINE SODIUM 100 MCG: 100 TABLET ORAL at 10:11

## 2018-12-29 RX ADMIN — MORPHINE SULFATE 2 MG: 10 INJECTION, SOLUTION INTRAMUSCULAR; INTRAVENOUS at 05:57

## 2018-12-29 RX ADMIN — ONDANSETRON 4 MG: 2 INJECTION INTRAMUSCULAR; INTRAVENOUS at 05:47

## 2018-12-29 RX ADMIN — SODIUM CHLORIDE, SODIUM LACTATE, POTASSIUM CHLORIDE, CALCIUM CHLORIDE AND DEXTROSE MONOHYDRATE: 5; 600; 310; 30; 20 INJECTION, SOLUTION INTRAVENOUS at 14:54

## 2018-12-29 RX ADMIN — SODIUM CHLORIDE 250 ML: 9 INJECTION, SOLUTION INTRAVENOUS at 01:00

## 2018-12-29 RX ADMIN — PHENYLEPHRINE HYDROCHLORIDE 100 MCG: 10 INJECTION, SOLUTION INTRAMUSCULAR; INTRAVENOUS; SUBCUTANEOUS at 05:57

## 2018-12-29 RX ADMIN — PHENYLEPHRINE HYDROCHLORIDE 200 MCG: 10 INJECTION, SOLUTION INTRAMUSCULAR; INTRAVENOUS; SUBCUTANEOUS at 05:44

## 2018-12-29 RX ADMIN — Medication 3 G: at 05:44

## 2018-12-29 RX ADMIN — LIDOCAINE HYDROCHLORIDE,EPINEPHRINE BITARTRATE 10 ML: 20; .005 INJECTION, SOLUTION EPIDURAL; INFILTRATION; INTRACAUDAL; PERINEURAL at 05:36

## 2018-12-29 RX ADMIN — KETOROLAC TROMETHAMINE 30 MG: 30 INJECTION, SOLUTION INTRAMUSCULAR at 14:47

## 2018-12-29 RX ADMIN — KETOROLAC TROMETHAMINE 30 MG: 30 INJECTION, SOLUTION INTRAMUSCULAR at 08:07

## 2018-12-29 RX ADMIN — SODIUM CHLORIDE, POTASSIUM CHLORIDE, SODIUM LACTATE AND CALCIUM CHLORIDE: 600; 310; 30; 20 INJECTION, SOLUTION INTRAVENOUS at 06:01

## 2018-12-29 RX ADMIN — SODIUM CHLORIDE, POTASSIUM CHLORIDE, SODIUM LACTATE AND CALCIUM CHLORIDE 1000 ML: 600; 310; 30; 20 INJECTION, SOLUTION INTRAVENOUS at 04:44

## 2018-12-29 RX ADMIN — ONDANSETRON 4 MG: 2 INJECTION INTRAMUSCULAR; INTRAVENOUS at 00:00

## 2018-12-29 RX ADMIN — ACETAMINOPHEN 975 MG: 325 TABLET, FILM COATED ORAL at 10:11

## 2018-12-29 RX ADMIN — SENNOSIDES AND DOCUSATE SODIUM 1 TABLET: 8.6; 5 TABLET ORAL at 20:28

## 2018-12-29 NOTE — PLAN OF CARE
Pt coping with uterine contractions asked for nitrous oxide to be available consent form signed and nitrous tanks set up in room instructions of use reviewed with pt and spouse verbalized understanding. Dr. Houser was at bedside at 1630 attempted to place fetal scalp electrode unable to place. Continue to monitor with external us as able. Category 1 tracing. IV infusing without difficulty. VS within normal limits. Vaginal bleeding minimal bloody show amniotic fluid small amount with meconium fluid noted

## 2018-12-29 NOTE — BRIEF OP NOTE
[facility]  Gynecology Brief Operative Note    Pre-operative diagnosis: 1. IUP at 40+6/7 wks  2. Meconium  3. Repetative deep variable decelerations   Post-operative diagnosis Same  4. Lac of baby left cheek  5. Bandl's Ring  6. Probable hemangioma and calcifications of placenta   Procedure: Procedure(s):   SECTION   Surgeon: Gaby Beck MD   Assistants(s): None   Anesthesia: Epidural    Estimated blood loss: 500 cc    Specimens: Cord blood, cord gas and placenta   Findings: Obscured lower uterine segment, laceration of fetal cheek, ADRIANO presentation, tight constriction of the lower uterine segment-Bandl's Ring, calcified placenta and likely hemangioma of the placenta   Complications: None   Comments: See dictated operative report for full details       Gaby Beck

## 2018-12-29 NOTE — PROGRESS NOTES
"Labor Progress Note:    S; Pt is comfortable with epidural. Continue on pitocin augmentation and has meconium.     O:  /72   Pulse 90   Temp 98.4  F (36.9  C) (Temporal)   Resp 16   Ht 1.676 m (5' 6\")   Wt 127 kg (280 lb)   LMP 2018   SpO2 92%   BMI 45.19 kg/m    SVE: 4/70/-2 IUPC and FSE placed.   TOCO: Q2-4 minutes  FHR: 140's variable decelerations    A/P: 34 yo  at 40+5/7 wks.   1. Anticpate . Continue with pitocin augmentation to adequacy.   2. Epidural for pain management.   3. RH positive.   "

## 2018-12-29 NOTE — H&P
Admitted:     2018      HISTORY OF PRESENT ILLNESS:  The patient is a 33-year-old G3, P2-0-0-2 with an uncomplicated pregnancy history.  She had known GBS positive and a urine culture at 33 weeks.  Otherwise, all other complications were hypothyroidism and obesity.  She was admitted in the morning of the  with meconium-stained fluid and no labor.  She was given Cytotec x 1 and then started on Pitocin augmentation.  She had very slow change of her cervix and at that time an IUPC and fetal scalp monitor was placed.  She continued on Pitocin augmentation with adequacy and had persistent slow change; however, she had repetitive variable decelerations with contractions and multiple times the Pitocin had to be decreased or discontinued.  She had an IUPC placed and amnioinfusion was begun.  With a digital massage the cervix did change to 8-9 cm.  We tried to slide this.  The fetal vertex was found to be asynclitic and flexed.  With maternal rotation she restituted to ADRIANO presentation.  However, there was no descent of the fetal vertex and cervix was then starting to swell.  With each of these contractions, the Pitocin had been decreased because there was deep variable decelerations with each contraction down to a ruby in the 60s. Due to the fact that we were remote from delivery as well as repetitive deep decelerations, decision was made to proceed with primary low transverse  section.  Risks, benefits and alternatives were discussed with her at length and she did sign informed consent.      PAST MEDICAL HISTORY:   1.  Obesity.   2.  Hypothyroidism.   3.  Group B strep positive in urine.      PAST OBSTETRICAL HISTORY:  Significant for vaginal delivery x 2.      PAST SURGICAL HISTORY:  Significant for knee arthroscopy x 1.      FAMILY HISTORY:  Noncontributory for blood dyscrasias or anesthetic complications.      SOCIAL HISTORY:  She is .  She has 2 children at home.  No significant drug, alcohol  or tobacco use history.      PHYSICAL EXAMINATION:   VITAL SIGNS:  Shows temperature of 99.7, blood pressure of 104/57, respiratory rate of 16 and satting 91% on room air.   CARDIOVASCULAR:  Regular rate and rhythm.   CHEST:  Clear to auscultation bilaterally.   ABDOMEN:  Gravid, nontender.  Estimated fetal weight is approximately 8-3/4 to 9 pounds.  Sterile vaginal exam is 9 cm, 0 station.  Fetal heart rate is baseline of 120s to 130s, minimal to moderate variability with deep decelerations nadiring in the 60s with each contraction.  Pitocin has now discontinued.  Contractions are every 8-10 minutes.      ASSESSMENT AND PLAN:  A 33-year-old G3, P2-0-0-2 for primary  section.  Risks, benefits and alternatives are discussed with she and her spouse.  They do sign informed consent.         LETICIA MELISSA MD             D: 2018   T: 2018   MT: SAUL      Name:     JULIA GIL   MRN:      [id number]        Account:      [id number]   :      1985        Admitted:     2018                   Document: [id number]

## 2018-12-29 NOTE — ANESTHESIA PREPROCEDURE EVALUATION
"Anesthesia Pre-Procedure Evaluation    Patient: Maryam Grier   MRN: [id number] : 1985          Preoperative Diagnosis: * No surgery found *        Past Medical History:   Diagnosis Date     Thyroid disease      Past Surgical History:   Procedure Laterality Date     KNEE SURGERY      arthroscopy       Anesthesia Evaluation     .             ROS/MED HX    ENT/Pulmonary:       Neurologic:       Cardiovascular:  - neg cardiovascular ROS       METS/Exercise Tolerance:     Hematologic:         Musculoskeletal:         GI/Hepatic:         Renal/Genitourinary:         Endo:         Psychiatric:         Infectious Disease:         Malignancy:         Other:                     neg OB ROS                   No results found for: WBC, HGB, HCT, PLT, CRP, SED, NA, POTASSIUM, CHLORIDE, CO2, BUN, CR, GLC, JOSE, PHOS, MAG, ALBUMIN, PROTTOTAL, ALT, AST, GGT, ALKPHOS, BILITOTAL, BILIDIRECT, LIPASE, AMYLASE, JACE, PTT, INR, FIBR, TSH, T4, T3, HCG, HCGS, CKTOTAL, CKMB, TROPN    Preop Vitals  BP Readings from Last 3 Encounters:   18 135/72   18 132/77    Pulse Readings from Last 3 Encounters:   18 90      Resp Readings from Last 3 Encounters:   18 16   18 18    SpO2 Readings from Last 3 Encounters:   18 92%      Temp Readings from Last 1 Encounters:   18 36.9  C (98.4  F) (Temporal)    Ht Readings from Last 1 Encounters:   18 1.676 m (5' 6\")      Wt Readings from Last 1 Encounters:   18 127 kg (280 lb)    Estimated body mass index is 45.19 kg/m  as calculated from the following:    Height as of this encounter: 1.676 m (5' 6\").    Weight as of this encounter: 127 kg (280 lb).       Anesthesia Plan      History & Physical Review      ASA Status:  2 .  OB Epidural Asa: 2       Plan for Epidural          Postoperative Care      Consents  Anesthetic plan, risks, benefits and alternatives discussed with:  Patient and Patient..                 Remy Kevni MD  "

## 2018-12-29 NOTE — ANESTHESIA CARE TRANSFER NOTE
Patient: Maryam Grier    Procedure(s):   SECTION    Diagnosis: pregnancy  Diagnosis Additional Information: No value filed.    Anesthesia Type:   Epidural     Note:  Airway :Room Air  Patient transferred to:Labor and Delivery  Comments:   Transferred to PACU RN. Patient awake and verbal. Spontaneous resp and on room air. Monitors and alarms on. VSS. Report given.Handoff Report: Identifed the Patient, Identified the Reponsible Provider, Reviewed the pertinent medical history, Discussed the surgical course, Reviewed Intra-OP anesthesia mangement and issues during anesthesia, Set expectations for post-procedure period and Allowed opportunity for questions and acknowledgement of understanding      Vitals: (Last set prior to Anesthesia Care Transfer)    CRNA VITALS  2018 0551 - 2018 0631      2018             Resp Rate (set):  10                Electronically Signed By: THEO Vázquez CRNA  2018  6:31 AM

## 2018-12-29 NOTE — PLAN OF CARE
Assisted Pt up to bathroom again.  Neelam cares performed.  Pt walked in the hallway with stand by assisted.  Pain is still under control.  Continues to monitor Pt.

## 2018-12-29 NOTE — PLAN OF CARE
VSS.  Incision C/D/I.  Up to bathroom with assist, tolerated well.  Pt tolerated regular diet well.  Pain well controlled, requesting prn pain meds as needed.  Working on breastfeeding  and  cares.  On pathway. Continue to monitor and notify MD as needed.

## 2018-12-29 NOTE — PLAN OF CARE
Dr. Jorge updated on contraction pattern, pitocin at 12, patient not very uncomfortable with contractions. Will continue to go up on pitocin as able.  2100 Patient requesting an epidural.    MDA at bedside for epidural placement. Patient tolerated procedure well and placed in left tilt position.   Dr. Jorge at bedside. SVE per MD- FSE, hawk and IUCP placed per MD. Will continue to titrate pitocin and notify MD as needed   Dr. Jorge notified of recurrent variables- patient repositioned, O2 applied, fluid bolus started. Will shut pitocin off and restart at 1/2 dose in 20 minutes if able   0053 Dr. Jorge notified of recurrent variable decelerations with contractions, patient has been repositioned. Orders for an amnioinfusion and to continue changing positions.    0145 Pitocin shut off, patient repositioned, O2 still infusing  0224 Pitocin restarted and titrating up as able  0430 Dr. Jorge notified of recurrent variable decelerations, pitocin shut off again. MD called to bedside to assess. MD checked patient, SVE 8-9. Plans to let patient labor for an additional 20 minutes and recheck and see if cervix is able to be retracted and pushed through.   0450 FHT's having decelerations, MD at bedside to assess.  Decision to proceed with  section   0520 Report given to Magaly FINLEY RN to assume cares

## 2018-12-29 NOTE — OP NOTE
Procedure Date: 2018      PREOPERATIVE DIAGNOSES:   1.  Intrauterine pregnancy at 40 and 6/7 weeks, meconium.   1.  Repetitive deep decelerations remote from delivery.      POSTOPERATIVE DIAGNOSES:   1.  Intrauterine pregnancy at 40 and 6/7 weeks, meconium.   2.  Repetitive deep decelerations remote from delivery.   3.  Laceration of the left baby's cheek.   4.  Bandl's ring.   5.  Probable hemangioma and calcifications of the placenta.      PROCEDURE:  Primary low transverse  section.      SURGEON:  Gaby Beck MD       ASSISTANT:  None.      ANESTHETIC: Epidural.       ESTIMATED BLOOD LOSS:  500 mL.      SPECIMENS:  Cord blood, cord gas, and placenta.      FINDINGS:  There is an obscured lower uterine segment with direct visualization and laceration of the fetal cheek.  It was found to be in the ADRIANO presentation.  On examination, on removal of the placenta, there was an extremely tight constriction of the lower uterine segment, likely a Bandl's ring and calcification of the placenta, and likely hemangioma of the placenta.      INDICATIONS:  The patient is a 33-year-old multiparous female at 40-6/7 weeks, who was scheduled for induction of labor, however, was brought in with rupture of membranes with meconium-stained fluid.  She received Cytotec placement x1, and then subsequent Pitocin augmentation.  She had extremely slow progression of the cervical dilation with a high fetal station.  Pitocin was continued and approximately 16 milliunits, and IUPC and fetal scalp electrode was placed.  She then shortly thereafter began having variable decelerations and amnioinfusion was begun.  Over the night at multiple times, the Pitocin was discontinued and then would often be restarted with return of the variable deep decelerations.  With cervical massage, we were able to move her cervix between 8 and 9 cm and we began over 2 contractions to try to actively push to restitute the fetal vertex from asynclitic  and flex to an ADRIANO presentation which did occur with maternal rotation.  However, the cervix was unable to be stretched.  Pitocin was unable to be restarted due to the deep variable decelerations with each contraction, and all of these issues were discussed with the parents.  They did opt for a primary low transverse  section.  Risks, benefits and alternatives of the procedure were discussed prior to the procedure and they did sign informed consent.      DESCRIPTION OF PROCEDURE:  The patient was brought to the operating room where epidural anesthetic was re-bolused and found to be adequate.  A pause for the cause was performed.  The patient and procedure was correctly identified.  A low transverse skin incision was made.  It was carried down to underlying fascial layer, which was scored with Bovie electrocautery and stretched bilaterally.  On visualization of the lower uterine segment, there was a very abnormal appearance, and it was difficult to discern where the fetal presentation was versus pubic symphysis.  There appeared to be firm constricted areas.  At that point,  the lower uterine segment was grasped with 2 Allis clamps and tented upward, and incision was begun layer by layer, even with a very cautious incision, a laceration of the fetal cheek was performed.  The incision was stretched bilaterally and the vertex delivered atraumatically.  The remainder of the infant was placed on the maternal abdomen and a delayed cord clamp was performed.  The cord was then clamped and cut by myself, and the infant was handed over to the NICU staff that was present.  At that point, uterine massage was performed, as well as manual extraction.  It was extremely difficult to enter the upper uterine segment with my hand due to a tight constriction of the lower uterine segment which felt very dysfunctional.  The placenta was removed and the hemangioma at the base of the placenta and cord was noted, and calcifications  of the placenta were noted as well to be sent to pathology.  The uterus was unable to be exteriorized due to its size and bulk, and therefore remained internal and a 2-layer closure was performed with 0 Monocryl.  At that point, the pericolic gutters were irrigated with moist laps, and all underlying tissue layers were made hemostatic with Bovie electrocautery.  Fascia was closed with 0 Vicryl without palpable defect.  The subcutaneous tissue was irrigated with moist lap and the skin was closed with Insorb staples and dressed with Tegaderm dressing.  All counts were correct, and she was transferred to the PACU in stable condition.         LETICIA MELISSA MD             D: 2018   T: 2018   MT: JOYCE      Name:     JULIA GIL   MRN:      [id number]        Account:        [id number]   :      1985           Procedure Date: 2018      Document: [id number]

## 2018-12-29 NOTE — ANESTHESIA POSTPROCEDURE EVALUATION
Patient: Maryam Grier    Procedure(s):   SECTION    Diagnosis:pregnancy  Diagnosis Additional Information: No value filed.    Anesthesia Type:  Epidural    Note:  Anesthesia Post Evaluation    Patient location during evaluation: PACU  Patient participation: Able to fully participate in evaluation  Level of consciousness: awake  Pain management: adequate  Airway patency: patent  Cardiovascular status: acceptable  Respiratory status: acceptable  Hydration status: acceptable  PONV: none     Anesthetic complications: None          Last vitals:  Vitals:    18 0647 18 0655 18 0700   BP: 97/61 105/57 108/58   Pulse:      Resp: 10 12 12   Temp:      SpO2:            Electronically Signed By: Remy Kevin MD  2018  7:16 AM

## 2018-12-29 NOTE — ANESTHESIA POSTPROCEDURE EVALUATION
Patient: Maryam Grier    * No procedures listed *    Diagnosis:* No pre-op diagnosis entered *  Diagnosis Additional Information: No value filed.    Anesthesia Type:  No value filed.    Note:  Anesthesia Post Evaluation    Patient location during evaluation: Floor and Bedside  Patient participation: Able to fully participate in evaluation  Level of consciousness: awake and alert  Cardiovascular status: acceptable  Respiratory status: acceptable       Comments: No apparent anesthetic complications. Patient sensory and motor intact.  Ambulating and voiding well. Denies HA. Patient satisfied with epidural analgesia.     Chris Donald D.O.  Staff Anesthesiologist  [facility]          Last vitals:  Vitals:    12/29/18 1447 12/29/18 1500 12/29/18 1600   BP:  111/66    Pulse:  77    Resp: 16 16 16   Temp:  36.9  C (98.4  F)    SpO2: 96% 96% 96%         Electronically Signed By: Chris Donald DO  December 29, 2018  4:58 PM

## 2018-12-29 NOTE — ANESTHESIA PREPROCEDURE EVALUATION
"Anesthesia Pre-Procedure Evaluation    Patient: Maryam Grier   MRN: [id number] : 1985          Preoperative Diagnosis: pregnancy    Procedure(s):   SECTION    Past Medical History:   Diagnosis Date     Thyroid disease      Past Surgical History:   Procedure Laterality Date     KNEE SURGERY      arthroscopy       Anesthesia Evaluation     . Pt has had prior anesthetic. Type: General    No history of anesthetic complications          ROS/MED HX    ENT/Pulmonary:  - neg pulmonary ROS     Neurologic:  - neg neurologic ROS     Cardiovascular:  - neg cardiovascular ROS       METS/Exercise Tolerance:  >4 METS   Hematologic:         Musculoskeletal:         GI/Hepatic:  - neg GI/hepatic ROS       Renal/Genitourinary:         Endo:     (+) thyroid problem hypothyroidism, Obesity, .      Psychiatric:         Infectious Disease:         Malignancy:         Other: Comment: Repeated deep variable decelerations in setting of meconium.    Patient comfortable with epidural.                         Physical Exam  Normal systems: dental    Airway   Mallampati: III  TM distance: >3 FB  Neck ROM: full    Dental     Cardiovascular   Rhythm and rate: regular and normal      Pulmonary    breath sounds clear to auscultation            No results found for: WBC, HGB, HCT, PLT, CRP, SED, NA, POTASSIUM, CHLORIDE, CO2, BUN, CR, GLC, JOSE, PHOS, MAG, ALBUMIN, PROTTOTAL, ALT, AST, GGT, ALKPHOS, BILITOTAL, BILIDIRECT, LIPASE, AMYLASE, JACE, PTT, INR, FIBR, TSH, T4, T3, HCG, HCGS, CKTOTAL, CKMB, TROPN    Preop Vitals  BP Readings from Last 3 Encounters:   18 104/54   18 132/77    Pulse Readings from Last 3 Encounters:   18 90      Resp Readings from Last 3 Encounters:   18 16   18 18    SpO2 Readings from Last 3 Encounters:   18 91%      Temp Readings from Last 1 Encounters:   18 37.6  C (99.7  F) (Temporal)    Ht Readings from Last 1 Encounters:   18 1.676 m (5' 6\")      Wt " "Readings from Last 1 Encounters:   12/28/18 127 kg (280 lb)    Estimated body mass index is 45.19 kg/m  as calculated from the following:    Height as of this encounter: 1.676 m (5' 6\").    Weight as of this encounter: 127 kg (280 lb).       Anesthesia Plan      History & Physical Review  History and physical reviewed and following examination; no interval change.    ASA Status:  2 .        Plan for Epidural          Postoperative Care      Consents  Anesthetic plan, risks, benefits and alternatives discussed with:  Patient.  Use of blood products discussed: Yes.   Use of blood products discussed with Patient.  Consented to blood products.  .                 Remy Kevin MD  "

## 2018-12-29 NOTE — PLAN OF CARE
At 0820, Pt. admitted from L&D via bed.. Pt. arrived with baby and was accompanied by , nurse, and arrived with personal belongings. Report was taken from Magaly Holbrook RN in L&D.  Pt. is A&Ox3 and VSS on RA. Fundus is firm and midline.  Vaginal bleeding is small.   Pt. c/o 0/10 pain. Pt. denied feeling nausea, CP, SOB, lightheadedness, or dizziness. LS clear and BS active. PIV patent and infusing.  Christie patent and draining.  Dressing to lower abdomen CDI.  Pneumoboots in place to BLE.  Pt. oriented to the room and call light system.

## 2018-12-29 NOTE — ANESTHESIA PROCEDURE NOTES
Peripheral nerve/Neuraxial procedure note : epidural catheter  Pre-Procedure    Location: OB      Pre-Anesthestic Checklist: patient identified, IV checked, risks and benefits discussed, informed consent, monitors and equipment checked and pre-op evaluation    Timeout  Correct Patient: Yes   Correct Procedure: Yes   Correct Site: Yes   Correct Laterality: N/A   Correct Position: Yes   Site Marked: N/A   .   Procedure Documentation    .    Procedure:    Epidural catheter.  Insertion Site:L3-4  (midline approach) Injection technique: LORT saline   Local skin infiltrated with 3 mL of 1% lidocaine.  ANNEL at 7 cm     Patient Prep;mask, sterile gloves, povidone-iodine 7.5% surgical scrub, patient draped.  .  Needle: [place] needle Needle Gauge: 17.    Needle Length (Inches) 3.5  # of attempts: 1 and  # of redirects:  2 .   Catheter: 19 G . .  Catheter threaded easily  5 cm epidural space.  12 cm at skin.   .    Assessment/Narrative  Paresthesias: No.  .  .  Aspiration negative for heme or CSF  . Test dose of 3 mL lidocaine 1.5% w/ 1:200,000 epinephrine at. Test dose negative for signs of intravascular, subdural or intrathecal injection. Comments:  Labor epidural.  Crisp ANNEL at 7 cm after left re direct.  Catheter threaded easily.  Negative aspiration.  Negative test dose.  No abnormal pain or paresthesia throughout.  Patient tolerated well.

## 2018-12-29 NOTE — PLAN OF CARE
Assisted Pt up to bathroom.  Pt did well and denied feeling any dizziness when up.  Neelam cares performed.  Pt brushed her teeth.  Assisted pt back to bed.  Pt tolerated toasts, crackers, and water well. Continues to monitor Pt.

## 2018-12-29 NOTE — PLAN OF CARE
0715 - Bedside report received from VIPUL Romero RN.  All cares assumed.    0830 - Data: Maryam Grier transferred to [facility] via bed at 0820. Baby transferred via parent's arms.  Action: Receiving unit notified of transfer: Yes. Patient and family notified of room change. Report given to KENDALL Nelson RN at 0830. Belongings sent to receiving unit. Accompanied by Registered Nurse. Oriented patient to surroundings. Call light within reach. ID bands double-checked with receiving RN.  Response: Patient tolerated transfer and is stable.

## 2018-12-30 LAB — HGB BLD-MCNC: 11 G/DL (ref 11.7–15.7)

## 2018-12-30 PROCEDURE — 36415 COLL VENOUS BLD VENIPUNCTURE: CPT | Performed by: OBSTETRICS & GYNECOLOGY

## 2018-12-30 PROCEDURE — 85018 HEMOGLOBIN: CPT | Performed by: OBSTETRICS & GYNECOLOGY

## 2018-12-30 PROCEDURE — 12000037 ZZH R&B POSTPARTUM INTERMEDIATE

## 2018-12-30 PROCEDURE — 25000128 H RX IP 250 OP 636: Performed by: OBSTETRICS & GYNECOLOGY

## 2018-12-30 PROCEDURE — 25000132 ZZH RX MED GY IP 250 OP 250 PS 637: Performed by: OBSTETRICS & GYNECOLOGY

## 2018-12-30 RX ADMIN — ACETAMINOPHEN 975 MG: 325 TABLET, FILM COATED ORAL at 17:56

## 2018-12-30 RX ADMIN — OXYCODONE HYDROCHLORIDE 5 MG: 5 TABLET ORAL at 09:44

## 2018-12-30 RX ADMIN — OXYCODONE HYDROCHLORIDE 10 MG: 5 TABLET ORAL at 12:38

## 2018-12-30 RX ADMIN — SENNOSIDES AND DOCUSATE SODIUM 1 TABLET: 8.6; 5 TABLET ORAL at 08:20

## 2018-12-30 RX ADMIN — OXYCODONE HYDROCHLORIDE 10 MG: 5 TABLET ORAL at 21:36

## 2018-12-30 RX ADMIN — OXYCODONE HYDROCHLORIDE 10 MG: 5 TABLET ORAL at 17:57

## 2018-12-30 RX ADMIN — IBUPROFEN 800 MG: 400 TABLET ORAL at 14:39

## 2018-12-30 RX ADMIN — KETOROLAC TROMETHAMINE 30 MG: 30 INJECTION, SOLUTION INTRAMUSCULAR at 02:06

## 2018-12-30 RX ADMIN — IBUPROFEN 800 MG: 400 TABLET ORAL at 21:36

## 2018-12-30 RX ADMIN — ACETAMINOPHEN 975 MG: 325 TABLET, FILM COATED ORAL at 02:06

## 2018-12-30 RX ADMIN — LEVOTHYROXINE SODIUM 100 MCG: 100 TABLET ORAL at 08:19

## 2018-12-30 RX ADMIN — IBUPROFEN 800 MG: 400 TABLET ORAL at 08:20

## 2018-12-30 RX ADMIN — ACETAMINOPHEN 975 MG: 325 TABLET, FILM COATED ORAL at 09:44

## 2018-12-30 NOTE — LACTATION NOTE
Initial Lactation visit.  Recommend unlimited, frequent breast feedings: At least 8 - 12 times every 24 hours. Avoid pacifiers and supplementation with formula unless medically indicated. Explained benefits of holding baby skin on skin to help promote better breastfeeding outcomes.   Infant was feeding well at time of visit on L side.  Latched well.  Maryam was independent with feeding.  Supplemented with donor milk overnight when baby was fussy and not content after feeding.  Assisted Maryam with using her Spectra pump.  She plans to pump after feedings to help get her milk in sooner.  Reviewed signs infant is getting enough.  Encouraged her to breast feed before supplementing.    Will revisit as needed.     Alysia Fuller RN, IBCLC

## 2018-12-30 NOTE — PLAN OF CARE
Vital signs stable. Fundus firm, scant flow. Moist drainage under incision dressing, no change this shift. Baby breastfeeding well, on demand feedings encouraged. Urine output adequate, hawk removed. IV saline locked. Tolerating crackers. Patient independently ambulating and performed pericares. On pathway, will continue to monitor.

## 2018-12-30 NOTE — PLAN OF CARE
VSS.  Incision C/D/I.  Up to bathroom independently, tolerated well.  Pain well controlled, requesting prn pain meds as needed.  Encouraged Pt to walk in the hallway 2-3 times today.  Working on breastfeeding  and  cares.  Started Pt pumping per her request. On pathway. Continue to monitor and notify MD as needed.

## 2018-12-30 NOTE — PROGRESS NOTES
Doing well. Took a shower and had voided.    vss afebrile  Abdomen soft and non tender  Fundus firm and non tender  Incision dry and intact tegaderm in place with small amount of serosanguinous fluid not accumulating  Extremities nl    Hgb= 11.0    A: POD 1 s/p primary c/s for fetal distress      Doing well  P: routine post op care

## 2018-12-30 NOTE — PLAN OF CARE
Resumed care at 1900. VSS. Fundus firm and midline. Scant flow. Denies pain, taking tylenol and toradol. Tegaderm with drainage present. Breastfeeding. Ambulating free of dizziness and lightheaded with stand by assist. Christie patent, adequate output. Bowel sounds hypoactive, not passing gas. Encouraged to call with needs, questions, or concerns. Will continue to monitor.

## 2018-12-31 PROCEDURE — 25000132 ZZH RX MED GY IP 250 OP 250 PS 637: Performed by: OBSTETRICS & GYNECOLOGY

## 2018-12-31 PROCEDURE — 12000037 ZZH R&B POSTPARTUM INTERMEDIATE

## 2018-12-31 RX ADMIN — OXYCODONE HYDROCHLORIDE 10 MG: 5 TABLET ORAL at 01:13

## 2018-12-31 RX ADMIN — ACETAMINOPHEN 975 MG: 325 TABLET, FILM COATED ORAL at 09:58

## 2018-12-31 RX ADMIN — SENNOSIDES AND DOCUSATE SODIUM 2 TABLET: 8.6; 5 TABLET ORAL at 20:31

## 2018-12-31 RX ADMIN — OXYCODONE HYDROCHLORIDE 5 MG: 5 TABLET ORAL at 19:13

## 2018-12-31 RX ADMIN — OXYCODONE HYDROCHLORIDE 5 MG: 5 TABLET ORAL at 09:12

## 2018-12-31 RX ADMIN — OXYCODONE HYDROCHLORIDE 5 MG: 5 TABLET ORAL at 12:36

## 2018-12-31 RX ADMIN — ACETAMINOPHEN 975 MG: 325 TABLET, FILM COATED ORAL at 17:51

## 2018-12-31 RX ADMIN — OXYCODONE HYDROCHLORIDE 10 MG: 5 TABLET ORAL at 06:11

## 2018-12-31 RX ADMIN — LEVOTHYROXINE SODIUM 100 MCG: 100 TABLET ORAL at 08:18

## 2018-12-31 RX ADMIN — OXYCODONE HYDROCHLORIDE 5 MG: 5 TABLET ORAL at 22:59

## 2018-12-31 RX ADMIN — IBUPROFEN 800 MG: 400 TABLET ORAL at 19:09

## 2018-12-31 RX ADMIN — SENNOSIDES AND DOCUSATE SODIUM 1 TABLET: 8.6; 5 TABLET ORAL at 01:13

## 2018-12-31 RX ADMIN — ACETAMINOPHEN 975 MG: 325 TABLET, FILM COATED ORAL at 01:12

## 2018-12-31 RX ADMIN — IBUPROFEN 800 MG: 400 TABLET ORAL at 06:11

## 2018-12-31 RX ADMIN — SENNOSIDES AND DOCUSATE SODIUM 2 TABLET: 8.6; 5 TABLET ORAL at 09:12

## 2018-12-31 RX ADMIN — IBUPROFEN 800 MG: 400 TABLET ORAL at 12:36

## 2018-12-31 RX ADMIN — OXYCODONE HYDROCHLORIDE 10 MG: 5 TABLET ORAL at 15:47

## 2018-12-31 NOTE — PROGRESS NOTES
"December 31, 2018      SUBJECTIVE: No acute overnight events.  Pain adequately controlled.  +Lochia, light.  Tolerating PO.  + Flatus.  Ambulating/urinating w/o difficulty.  Denies CP/palp/SOB/LH.    OBJECTIVE: /76   Pulse 76   Temp 97.9  F (36.6  C) (Oral)   Resp 16   Ht 1.676 m (5' 6\")   Wt 127 kg (280 lb)   LMP 03/12/2018   SpO2 97%   Breastfeeding? Unknown   BMI 45.19 kg/m    Gen: NAD, A&O x3  Abd: soft.  Incision C/D/I, no active bleeding.  No erythema, induration, or discharge, tegaderm in place. Mild erythema of pannus, however not involving incision.  No induration, non-tender.  Monitor  Ext: moderate edema BL LEs, symmetric, no CT    Hemoglobin   Date Value Ref Range Status   12/30/2018 11.0 (L) 11.7 - 15.7 g/dL Final   ]    A/P: POD#2 s/p primary LTCS.  Doing well.  Afebrile, VSS.  - ibuprofen/oxycodone/tylenol PRN pain  - regular diet as tolerated  - breastfeeding  - encouraged ambulation  - routine post-op care  - plan for home tomorrow.  Pt states baby cannot be discharge until after 14:00 tomorrow (getting abx and needs hearing screen after abx)        PATTIE PEPPER MD    "

## 2019-01-01 PROCEDURE — 12000035 ZZH R&B POSTPARTUM

## 2019-01-01 PROCEDURE — 25000132 ZZH RX MED GY IP 250 OP 250 PS 637: Performed by: OBSTETRICS & GYNECOLOGY

## 2019-01-01 RX ORDER — OXYCODONE HYDROCHLORIDE 5 MG/1
5 TABLET ORAL EVERY 4 HOURS PRN
Qty: 20 TABLET | Refills: 0 | Status: SHIPPED | OUTPATIENT
Start: 2019-01-01 | End: 2019-03-07

## 2019-01-01 RX ADMIN — IBUPROFEN 800 MG: 400 TABLET ORAL at 12:26

## 2019-01-01 RX ADMIN — ACETAMINOPHEN 975 MG: 325 TABLET, FILM COATED ORAL at 02:13

## 2019-01-01 RX ADMIN — SENNOSIDES AND DOCUSATE SODIUM 2 TABLET: 8.6; 5 TABLET ORAL at 07:38

## 2019-01-01 RX ADMIN — OXYCODONE HYDROCHLORIDE 10 MG: 5 TABLET ORAL at 02:13

## 2019-01-01 RX ADMIN — LEVOTHYROXINE SODIUM 100 MCG: 100 TABLET ORAL at 07:38

## 2019-01-01 RX ADMIN — ACETAMINOPHEN 650 MG: 325 TABLET, FILM COATED ORAL at 12:32

## 2019-01-01 RX ADMIN — IBUPROFEN 800 MG: 400 TABLET ORAL at 05:38

## 2019-01-01 RX ADMIN — ACETAMINOPHEN 650 MG: 325 TABLET, FILM COATED ORAL at 21:07

## 2019-01-01 RX ADMIN — OXYCODONE HYDROCHLORIDE 10 MG: 5 TABLET ORAL at 12:33

## 2019-01-01 RX ADMIN — IBUPROFEN 800 MG: 400 TABLET ORAL at 19:01

## 2019-01-01 RX ADMIN — OXYCODONE HYDROCHLORIDE 5 MG: 5 TABLET ORAL at 05:38

## 2019-01-01 RX ADMIN — OXYCODONE HYDROCHLORIDE 10 MG: 5 TABLET ORAL at 21:06

## 2019-01-01 RX ADMIN — OXYCODONE HYDROCHLORIDE 10 MG: 5 TABLET ORAL at 15:35

## 2019-01-01 NOTE — PLAN OF CARE
Patient's vital signs are stable.  She is tolerating diet, ambulating and caring for the baby independently.  Adequate pain control with oral pain medications.  Pain seems to be improving and she states the abdominal binder helps.  Incision is clean, dry and intact.  Lochia is WNL.

## 2019-01-01 NOTE — LACTATION NOTE
Follow up visit.  Infant has been breast feeding well, using sns for supplement when feeding.  Pumping after.  Maryam said she is getting a small amount more today. She had no concerns and denied needing any assistance.  She feels feeding are going well.  She said her pumping is going well.  Offered to assist as needed.   Alysia Fuller RN, IBCLC

## 2019-01-01 NOTE — PLAN OF CARE
VSS BP at 1200-134/81 and at 1400 131/84. Dr. Escamilla called with an update. Pt's baby staying one more night. Pt planning on discharging patient tomorrow.Pt using Ibuprofen, oxycodone, and tylenol for pain control. Breastfeeding on demand, baby latching well. Also pumping after feeds. Will continue to monitor.

## 2019-01-01 NOTE — LACTATION NOTE
This note was copied from a baby's chart.  Lactation follow up. Comfortable with feeding plan. Continues to pump with Spectra following each feeding (starting to get drops!) as well as supplement with donor milk and SNS. No questions at this time. Would like to be seen before discharge tomorrow.    Amarilis Mcmahon RN, IBCLC

## 2019-01-01 NOTE — PROGRESS NOTES
"January 1, 2019      SUBJECTIVE: No acute overnight events.  Pain adequately controlled.  +Lochia, light.  Tolerating PO.  + Flatus.  Ambulating/urinating w/o difficulty.  Denies CP/palp/SOB/LH.    OBJECTIVE: /76   Pulse 77   Temp 97.8  F (36.6  C) (Oral)   Resp 16   Ht 1.676 m (5' 6\")   Wt 127 kg (280 lb)   LMP 03/12/2018   SpO2 97%   Breastfeeding? Unknown   BMI 45.19 kg/m    Gen: NAD, A&O x3  Abd: soft.  Incision C/D/I, no active bleeding.  No erythema, induration, or discharge; tegaderm in place; Mild erythema of skin of pannus (NOT involving incision), but no pruritis, edema, tenderness or warmth  Ext: moderate edema BL LEs up to knees, symmetric, no CT    Hemoglobin   Date Value Ref Range Status   12/30/2018 11.0 (L) 11.7 - 15.7 g/dL Final   ]    A/P: POD#3 s/p primary LTCS for NRFHT, noted to have Bandl's ring.  Doing well.  Afebrile, VSS.  - ibuprofen/oxycodone/tylenol PRN pain  - regular diet as tolerated  - breastfeeding  - encouraged ambulation  - hypothyroidism: continue levothyroxine.  Repeat TFTs at PP visit  - routine post-op care  - if BP wnl/stable this AM, ok for discharge home with f/u in office in 6 weeks.  Wound care, restrictions reviewed.          PATTIE PEPPER MD    "

## 2019-01-01 NOTE — PLAN OF CARE
VSS. Pt states adequate rest and pain control with PRN meds. Breast feeding with SNS every 2 to 3 hours. Parents independent with infant cares.

## 2019-01-02 VITALS
OXYGEN SATURATION: 97 % | HEART RATE: 82 BPM | WEIGHT: 280 LBS | DIASTOLIC BLOOD PRESSURE: 86 MMHG | HEIGHT: 66 IN | SYSTOLIC BLOOD PRESSURE: 130 MMHG | TEMPERATURE: 97.5 F | RESPIRATION RATE: 16 BRPM | BODY MASS INDEX: 45 KG/M2

## 2019-01-02 LAB — COPATH REPORT: NORMAL

## 2019-01-02 PROCEDURE — 25000132 ZZH RX MED GY IP 250 OP 250 PS 637: Performed by: OBSTETRICS & GYNECOLOGY

## 2019-01-02 RX ADMIN — SENNOSIDES AND DOCUSATE SODIUM 1 TABLET: 8.6; 5 TABLET ORAL at 08:39

## 2019-01-02 RX ADMIN — ACETAMINOPHEN 650 MG: 325 TABLET, FILM COATED ORAL at 07:09

## 2019-01-02 RX ADMIN — IBUPROFEN 800 MG: 400 TABLET ORAL at 01:20

## 2019-01-02 RX ADMIN — OXYCODONE HYDROCHLORIDE 5 MG: 5 TABLET ORAL at 04:16

## 2019-01-02 RX ADMIN — ACETAMINOPHEN 650 MG: 325 TABLET, FILM COATED ORAL at 01:20

## 2019-01-02 RX ADMIN — IBUPROFEN 800 MG: 400 TABLET ORAL at 07:09

## 2019-01-02 RX ADMIN — OXYCODONE HYDROCHLORIDE 5 MG: 5 TABLET ORAL at 10:24

## 2019-01-02 RX ADMIN — OXYCODONE HYDROCHLORIDE 5 MG: 5 TABLET ORAL at 07:09

## 2019-01-02 RX ADMIN — OXYCODONE HYDROCHLORIDE 10 MG: 5 TABLET ORAL at 01:20

## 2019-01-02 RX ADMIN — LEVOTHYROXINE SODIUM 100 MCG: 100 TABLET ORAL at 08:38

## 2019-01-02 NOTE — PLAN OF CARE
Patient's vital signs are stable.  She is tolerating diet, ambulating and caring for the baby independently.  Adequate pain control with oral pain medications.  Incision is clean, dry and intact.  Lochia is WNL.   She is working on breast feeding and baby has been cluster feeding.  Nurse providing support as needed.  All questions answered.

## 2019-01-02 NOTE — LACTATION NOTE
Follow up visit.  Infant has been feeding well at breast.  Fussier this morning but she was able to latch baby to L side during visit.  Infant was swallowing well during feeding.  Maryam is pumping more today as her milk is coming in.  Supplementing as needed after baby breast feeds.  Encouraged Maryam to let baby feed as long as she wants.  Nipples are intact, not sore today.   Reviewed outpatient lactation resources for follow up when discharged if having difficulty.  Maryam had no concerns or further questions.   Alysia Fuller  RN, IBCLC

## 2019-01-02 NOTE — DISCHARGE INSTRUCTIONS
Postop  Birth Instructions    Activity       Do not lift more than 10 pounds for 6 weeks after surgery.  Ask family and friends for help when you need it.    No driving until you have stopped taking your pain medications (usually two weeks after surgery).    No heavy exercise or activity for 6 weeks.  Don't do anything that will put a strain on your surgery site.    Don't strain when using the toilet.  Your care team may prescribe a stool softener if you have problems with your bowel movements.     To care for your incision:       Keep the incision clean and dry.    Do not soak your incision in water. No swimming or hot tubs until it has fully healed. You may soak in the bathtub if the water level is below your incision.    Do not use peroxide, gel, cream, lotion, or ointment on your incision.    Adjust your clothes to avoid pressure on your surgery site (check the elastic in your underwear for example).     You may see a small amount of clear or pink drainage and this is normal.  Check with your health care provider:       If the drainage increases or has an odor.    If the incision reddens, you have swelling, or develop a rash.    If you have increased pain and the medicine we prescribed doesn't help.    If you have a fever above 100.4 F (38 C) with or without chills when placing thermometer under your tongue.   The area around your incision (surgery wound), will feel numb.  This is normal. The numbness should go away in less than a year.     Keep your hands clean:  Always wash your hands before touching your incision (surgery wound). This helps reduce your risk of infection. If your hands aren't dirty, you may use an alcohol hand-rub to clean your hands. Keep your nails clean and short.    Call your healthcare provider if you have any of these symptoms:       You soak a sanitary pad with blood within 1 hour, or you see blood clots larger than a golf ball.    Bleeding that lasts more than 6  weeks.    Vaginal discharge that smells bad.    Severe pain, cramping or tenderness in your lower belly area.    A need to urinate more frequently (use the toilet more often), more urgently (use the toilet very quickly), or it burns when you urinate.    Nausea and vomiting.    Redness, swelling or pain around a vein in your leg.    Problems breastfeeding or a red or painful area on your breast.    Chest pain and cough or are gasping for air.    Problems with coping with sadness, anxiety or depression. If you have concerns about hurting yourself or the baby, call your provider immediately.      You have questions or concerns after you return home.              You should be on pelvic rest with no heavy lifting >25 lb for 6 weeks.  Please call or return if you have fever >/= 100.4 degrees Farenheit (38.0 degrees Celsius), severe worsening abdominal pain not relieved by oral pain medications, heavy persistent vaginal bleeding, chest pain, palpitations, shortness of breath, dizziness, depressed mood, or for any other major concern.  You may use over the counter ibuprofen (400 mg every 4 hours) alternating with tylenol (500 mg every 4 hours) as needed for pain.  Do NOT exceed over 3200 mg of ibuprofen/24 hours, and 4000 mg of tylenol/24 hours.  You can alternate these with your narcotic pain medication, oxycodone 5 mg every 4 hours. You may also use over the counter stool softener (Colace/Docusate 100 mg 1-2 tabs per day) as needed for constipation.  These are safe with breastfeeding.    Please call the office to schedule a routine postpartum examination in 6 weeks, or sooner if instructed so by your physician.  If you had gestational diabetes during pregnancy, then you must also schedule a 2 hour glucose test during your postpartum examination.

## 2019-01-02 NOTE — PROGRESS NOTES
OB Postpartum Note    S:  Patient without complaints. Nausea controlled, tolerating regular diet. Ambulating, voiding without difficulty. Passing flatus. Minimal lochia. Breastfeeding well. Pain controlled.     O:  Vitals were reviewed  Temp: 97.5  F (36.4  C) Temp  Min: 97.5  F (36.4  C)  Max: 98.1  F (36.7  C)  Heart Rate: 77 Heart Rate  Min: 77  Max: 85  BP: 136/86 Systolic (24hrs), Av , Min:127 , Max:136   Diastolic (24hrs), Av, Min:81, Max:86          Gen - NAD, resting        CV - RRR        Abdomen - Fundus firm, below umbilicus, nontender        Incision - C/D/I, bandage removed         Extremities - No calf tenderness, no unilateral edema    Hemoglobin   Date Value Ref Range Status   2018 11.0 (L) 11.7 - 15.7 g/dL Final   ]  ABO   Date Value Ref Range Status   2018 A  Final   2018 A  Final     RH(D)   Date Value Ref Range Status   2018 Pos  Final   2018 Pos  Final     No components found for: RUBELLA    A:   Postoperative Day #4 , s/p CS - doing well    P:  1)  BPs stable, feeling well. Reviewed pre-eclampsia symptoms.        2)  Discharge home, Follow-up in 6 weeks.       Laura Tatum MD

## 2019-01-02 NOTE — PLAN OF CARE
BPs slightly elevated, appears to be baseline, asymptomatic.  Incision WNL.  Up in room independently.  Pain well controlled with tylenol, ibuprofen, and 5-10mg oxycodone.  Breastfeeding well. Pumping after feedings and supplementing EBM via syringe. 2-3+ edema in lower extremities, ambulation and fluids encouraged. Questions encouraged.  On pathway. Continue to monitor and notify MD as needed. Plan to discharge 1/2.

## 2019-01-27 NOTE — DISCHARGE SUMMARY
Pt was admitted 12/28 for induction of labor. After cervical ripening, meconium stained fluid and prolonged induction she remained with a category 2 tracing remote from delivery and was delivered via primary c/s. Her postpartum course was uncomplicated and was discharged with routine teaching. Please see the hospital chart for details of admission.     Gaby Beck

## 2019-03-07 ENCOUNTER — OFFICE VISIT (OUTPATIENT)
Dept: FAMILY MEDICINE | Facility: CLINIC | Age: 34
End: 2019-03-07
Payer: COMMERCIAL

## 2019-03-07 VITALS
TEMPERATURE: 97.8 F | BODY MASS INDEX: 40.18 KG/M2 | DIASTOLIC BLOOD PRESSURE: 72 MMHG | OXYGEN SATURATION: 97 % | HEIGHT: 67 IN | SYSTOLIC BLOOD PRESSURE: 112 MMHG | HEART RATE: 98 BPM | WEIGHT: 256 LBS

## 2019-03-07 DIAGNOSIS — E66.01 MORBID OBESITY (H): ICD-10-CM

## 2019-03-07 DIAGNOSIS — H00.024 HORDEOLUM INTERNUM OF LEFT UPPER EYELID: Primary | ICD-10-CM

## 2019-03-07 PROBLEM — N20.0 NEPHROLITHIASIS: Status: ACTIVE | Noted: 2017-06-19

## 2019-03-07 PROCEDURE — 99203 OFFICE O/P NEW LOW 30 MIN: CPT | Performed by: FAMILY MEDICINE

## 2019-03-07 RX ORDER — GENTAMICIN SULFATE 3 MG/ML
1 SOLUTION/ DROPS OPHTHALMIC EVERY 4 HOURS
Qty: 5 ML | Refills: 0 | Status: SHIPPED | OUTPATIENT
Start: 2019-03-07 | End: 2019-03-14

## 2019-03-07 ASSESSMENT — MIFFLIN-ST. JEOR: SCORE: 1888.84

## 2019-03-07 NOTE — PATIENT INSTRUCTIONS
Patient Education              Thank you very much for trusting me and [facility].   I appreciate the opportunity to serve you and look forward to supporting your healthcare needs in the future.    Sincerely,         Kyler Faustin MD    Sty (or Stye)  A sty is an infection of the oil gland of the eyelid. It may develop into a small pocket of pus (an abscess). This can cause pain, redness, and swelling. In early stages, a sty is treated with antibiotic cream, eye drops, or a small towel soaked in warm water (a warm compress). More severe cases may need to be opened and drained by a healthcare provider.  Home care    Eye drops or ointment are usually prescribed to treat the infection. Use these as directed.     Artificial tears may also be used to lubricate the eye and make it more comfortable. You can buy these over the counter without a prescription. Talk with your healthcare provider before using any over-the-counter treatment for a sty.    Apply a warm, damp towel to the affected eye for at least 5 minutes, 3 to 4 times a day for a week. Warm compresses open the pores and speed the healing. But if the compresses are too hot, they may burn your eyelid.    Sometimes the sty will drain with this treatment alone. If this happens, keep using the antibiotic until all the redness and swelling are gone.    Wash your hands before and after touching the infected eyelid to avoid spreading the infection.    Don t squeeze or try to break open the sty.  Follow-up care  Follow up with your healthcare provider, or as advised.   When to seek medical advice  Call your healthcare provider right away if any of these occur:    Increase in swelling or redness around the eyelid after 48 to 72 hours    Increase in eye pain or the eyelid blisters    Increase in warmth the eyelid feels hot    Drainage of blood or thick pus from the sty    Blister on the eyelid    Inability to open the eyelid due to swelling    Fever of  100.4 F (38 C) or above, or as directed by your provider    Vision changes    Headache or stiff neck    The sty comes back  Date Last Reviewed: 8/1/2017 2000-2018 [employer] [facility]. [address] [place] [address], [place] [address]. All rights reserved. This information is not intended as a substitute for professional medical care. Always follow your healthcare professional's instructions.

## 2019-03-07 NOTE — PROGRESS NOTES
"  SUBJECTIVE:                                                      Maryam Grier is a 33 year old female who presents to clinic today for the following health issues:    Eye(s) Problem      Duration: 2-3 days     Description:  Location: left  Pain: YES, lid swelling has been become larger.   Vision troubles: No.   Sclera: normal white.    Redness: YES  Discharge: No but watery     Accompanying signs and symptoms: none    History (Trauma, foreign body exposure,): None, previous history of styes.     Precipitating or alleviating factors (contact use): None    Therapies tried and outcome: None      Problem list and histories reviewed & adjusted, as indicated.  Additional history: as documented    ROS:  Constitutional, HEENT, cardiovascular, pulmonary, GI, , musculoskeletal, neuro, skin, endocrine and psych systems are negative, except as otherwise noted.  This document serves as a record of the services and decisions personally performed and made by Nabor Faustin MD. It was created on his behalf by Benson Ramos, a trained medical scribe. The creation of this document is based the provider's statements to the medical scribe.  Scribe Benson Ramos 9:15 AM, March 7, 2019    OBJECTIVE:                                                    /72 (BP Location: Right arm, Patient Position: Chair, Cuff Size: Adult Large)   Pulse 98   Temp 97.8  F (36.6  C) (Tympanic)   Ht 1.702 m (5' 7\")   Wt 116.1 kg (256 lb)   SpO2 97%   BMI 40.10 kg/m   Body mass index is 40.1 kg/m .   GENERAL: no apparent distress  EYES: Conjunctiva are not injected, no discharge; left lid with redness and noted left lateral internal stye  proparacaine drops used for anaesthesia and drained with 21 gauge needle    EARS: Left TM -no erythema, no effusion,  not bulged.               Right TM -no erythema, no effusion,  not bulged.  NOSE: no discharge, no sinus tenderness  THROAT: no erythema, no exudate, no lesions  NECK: supple, no " "adenopathy.  CARDIAC: regular rate and rhythm, no murmur  RESP: clear, no wheezing, no rales, no rhonchi  ABD: soft, no distension, no tenderness  SKIN: No rashes        ASSESSMENT/PLAN:                                                      Maryam was seen today for eye problem.    Diagnoses and all orders for this visit:    Hordeolum internum of left upper eyelid  -     gentamicin (GARAMYCIN) 0.3 % ophthalmic solution; Place 1 drop into both eyes every 4 hours for 7 days    Morbid Obesity - postpartum and working on diet and activity      Acute onset, was given drops and drained associated site and recommended drops. Begin:     Risks, benefits and alternatives of treatments discussed. Plan agreed on.      Followup: Return in about 1 week (around 3/14/2019), or if symptoms worsen or fail to improve.    See patient instructions.     BMI:   Estimated body mass index is 40.1 kg/m  as calculated from the following:    Height as of this encounter: 1.702 m (5' 7\").    Weight as of this encounter: 116.1 kg (256 lb).   Weight management plan: Discussed healthy diet and exercise guidelines    The information in this document, created by the medical scribe for me, accurately reflects the services I personally performed and the decisions made by me. I have reviewed and approved this document for accuracy prior to leaving the patient care area.  9:19 AM, 03/07/19        Kyler Faustin MD   Pager: [phone]    "

## 2020-02-10 LAB
HBA1C MFR BLD: 4.7 % (ref 4.8–5.6)
TSH SERPL-ACNC: 4.23 ULU/ML (ref 0.45–4.5)

## 2020-03-09 LAB — TSH SERPL-ACNC: 4.4 ULU/ML (ref 0.45–4.5)

## 2020-05-15 ENCOUNTER — TRANSFERRED RECORDS (OUTPATIENT)
Dept: HEALTH INFORMATION MANAGEMENT | Facility: CLINIC | Age: 35
End: 2020-05-15

## 2020-10-02 ENCOUNTER — TRANSFERRED RECORDS (OUTPATIENT)
Dept: HEALTH INFORMATION MANAGEMENT | Facility: CLINIC | Age: 35
End: 2020-10-02

## 2020-10-09 ENCOUNTER — TRANSFERRED RECORDS (OUTPATIENT)
Dept: HEALTH INFORMATION MANAGEMENT | Facility: CLINIC | Age: 35
End: 2020-10-09

## 2020-10-09 LAB
ALT SERPL-CCNC: 16 IU/L (ref 0–32)
AST SERPL-CCNC: 13 IU/L (ref 0–40)
CREAT SERPL-MCNC: 0.8 MG/DL (ref 0.57–1)
GLUCOSE SERPL-MCNC: 88 MG/DL (ref 65–99)
POTASSIUM SERPL-SCNC: 5.1 MMOL/L (ref 3.5–5.2)

## 2020-12-28 ENCOUNTER — OFFICE VISIT (OUTPATIENT)
Dept: FAMILY MEDICINE | Facility: CLINIC | Age: 35
End: 2020-12-28
Payer: COMMERCIAL

## 2020-12-28 VITALS
TEMPERATURE: 98.1 F | DIASTOLIC BLOOD PRESSURE: 74 MMHG | SYSTOLIC BLOOD PRESSURE: 108 MMHG | OXYGEN SATURATION: 99 % | HEART RATE: 94 BPM | WEIGHT: 263 LBS | BODY MASS INDEX: 41.19 KG/M2

## 2020-12-28 DIAGNOSIS — N23 KIDNEY PAIN: ICD-10-CM

## 2020-12-28 DIAGNOSIS — M54.50 ACUTE LEFT-SIDED LOW BACK PAIN WITHOUT SCIATICA: Primary | ICD-10-CM

## 2020-12-28 DIAGNOSIS — R82.90 NONSPECIFIC FINDING ON EXAMINATION OF URINE: ICD-10-CM

## 2020-12-28 LAB
ALBUMIN UR-MCNC: ABNORMAL MG/DL
APPEARANCE UR: CLEAR
BACTERIA #/AREA URNS HPF: ABNORMAL /HPF
BILIRUB UR QL STRIP: NEGATIVE
COLOR UR AUTO: YELLOW
GLUCOSE UR STRIP-MCNC: NEGATIVE MG/DL
HGB UR QL STRIP: ABNORMAL
KETONES UR STRIP-MCNC: NEGATIVE MG/DL
LEUKOCYTE ESTERASE UR QL STRIP: NEGATIVE
NITRATE UR QL: NEGATIVE
NON-SQ EPI CELLS #/AREA URNS LPF: ABNORMAL /LPF
PH UR STRIP: 5.5 PH (ref 5–7)
RBC #/AREA URNS AUTO: ABNORMAL /HPF
SOURCE: ABNORMAL
SP GR UR STRIP: 1.02 (ref 1–1.03)
UROBILINOGEN UR STRIP-ACNC: 0.2 EU/DL (ref 0.2–1)
WBC #/AREA URNS AUTO: ABNORMAL /HPF

## 2020-12-28 PROCEDURE — 99214 OFFICE O/P EST MOD 30 MIN: CPT | Performed by: NURSE PRACTITIONER

## 2020-12-28 PROCEDURE — 87086 URINE CULTURE/COLONY COUNT: CPT | Performed by: NURSE PRACTITIONER

## 2020-12-28 PROCEDURE — 81001 URINALYSIS AUTO W/SCOPE: CPT | Performed by: NURSE PRACTITIONER

## 2020-12-28 RX ORDER — NORETHINDRONE 0.35 MG/1
TABLET ORAL
COMMUNITY
Start: 2020-11-10

## 2020-12-28 RX ORDER — LEVOTHYROXINE SODIUM 112 UG/1
TABLET ORAL
COMMUNITY
Start: 2020-12-28 | End: 2021-01-15

## 2020-12-28 NOTE — PROGRESS NOTES
Subjective   Maryam Grier is a 35 year old female who presents to clinic today for the following health issues:    HPI   Abdominal/Flank Pain  Onset/Duration: x 1 day   Description:   Character: Cramping  Location: left lower quadrant, worse with exertion   Radiation: Back, left hip   Intensity: moderate  Progression of Symptoms:  improving and intermittent  Accompanying Signs & Symptoms:  Fever/Chills: no  Gas/Bloating: YES- heaviness   Nausea: YES- mild   Vomitting: no  Diarrhea: no  Constipation: no  Dysuria or Hematuria: no  History:   Trauma: YES- hx of  x 1 years ago   Previous similar pain: Yes   Previous tests done: imaging done in the past   Precipitating factors:   Does the pain change with:     Food: no    Bowel Movement: no    Urination: no   Other factors:  YES- worse with movement   Therapies tried and outcome: heating pad and sitting   No LMP recorded.      Left side back, flank area pain. No change to urination or stool habits. Pain as day goes on increases but no pain with movement really. Doesn't feel muscular. History of kidney stones but feels different.       Reviewed and updated as needed this visit by provider:  Tobacco  Allergies  Meds  Problems  Med Hx  Surg Hx  Fam Hx              Constitutional, HEENT, cardiovascular, pulmonary, GI, , musculoskeletal, neuro, skin, endocrine and psych systems are negative, except as otherwise noted per HPI.      Objective   /74   Pulse 94   Temp 98.1  F (36.7  C) (Tympanic)   Wt 119.3 kg (263 lb)   SpO2 99%   Breastfeeding No   BMI 41.19 kg/m   Body mass index is 41.19 kg/m .  Physical Exam   GENERAL: healthy, alert, well nourished, well hydrated, no distress  HENT: ear canals- normal; TMs- normal; Nose- normal; Mouth- no ulcers, no lesions  NECK: no tenderness, no adenopathy, no asymmetry, no masses, no stiffness; thyroid- normal to palpation  RESP: lungs clear to auscultation - no rales, no rhonchi, no wheezes  CV:  regular rates and rhythm, normal S1 S2, no S3 or S4 and no murmur, no click or rub -  ABDOMEN: soft, no tenderness, no  hepatosplenomegaly, no masses, normal bowel sounds  BACK: left CVA tenderness, no paralumbar tenderness    Diagnostic Test Results  Urinalysis - appears contaminated, will culture      Assessment & Plan   Maryam was seen today for flank pain.    Diagnoses and all orders for this visit:    Acute left-sided low back pain without sciatica  -     *UA reflex to Microscopic and Culture ([facility] (except [place] and [place])  -     Urine Microscopic    Nonspecific finding on examination of urine  -     Urine Culture Aerobic Bacterial    Kidney pain  -     US Renal Complete; Future      Scan kidneys to see if an issue there or kidney stone.       See Patient Instructions    No follow-ups on file.            DION Robb     [address] [address]  [email or web address]   Office: [phone]

## 2020-12-29 ENCOUNTER — HOSPITAL ENCOUNTER (OUTPATIENT)
Dept: ULTRASOUND IMAGING | Facility: CLINIC | Age: 35
Discharge: HOME | End: 2020-12-29
Attending: NURSE PRACTITIONER | Admitting: NURSE PRACTITIONER
Payer: COMMERCIAL

## 2020-12-29 DIAGNOSIS — N23 KIDNEY PAIN: ICD-10-CM

## 2020-12-29 LAB
BACTERIA SPEC CULT: NORMAL
Lab: NORMAL
SPECIMEN SOURCE: NORMAL

## 2020-12-29 PROCEDURE — 76770 US EXAM ABDO BACK WALL COMP: CPT

## 2021-01-14 NOTE — PROGRESS NOTES
"  Assessment & Plan   Left flank pain  Recurrent left flank pain suspicious for possible renal colic.  Previous imaging studies reviewed and we will have her see urology to discuss next steps.  Differential includes possible muscular origin but she feels it is deeper than that and her exam was minimally tender. Tylenol #3 given per patient request #12.  Can also use Tylenol extra strength and/or ibuprofen as tolerated.  Recommended staying well-hydrated.  - UROLOGY ADULT REFERRAL    Hypothyroidism, unspecified type  Controlled - continue medication.  Due for lab recheck  - TSH with free T4 reflex  - levothyroxine (SYNTHROID/LEVOTHROID) 112 MCG tablet  Dispense: 90 tablet; Refill: 3    Ganglion cyst of wrist, right  Reassurance, nontender and can see orthopedic if increased symptoms.    Need for hepatitis C screening test  - Hepatitis C Screen Reflex to HCV RNA Quant and Genotype      BMI:   Estimated body mass index is 40.88 kg/m  as calculated from the following:    Height as of this encounter: 1.702 m (5' 7\").    Weight as of this encounter: 118.4 kg (261 lb).   Weight management plan: Discussed healthy diet and exercise guidelines      Return in about 1 month (around 2/15/2021) for symptoms failing to improve or sooner if worsening.      Kyler Faustin MD      [address] [address]  [employer]   Office: [phone]       Manuel Francisco is a 35 year old who presents to clinic today for the following health issues     HPI       New Patient/Transfer of Care    Right wrist bump x 2 months - nontender    Left  low back/flank pain x off and on for the last year - seen 3 weeks ago. Occasional radiation to the left anterior hip region.  Has woken from it  Wakes at times with it.    US results for kidney stones:  ULTRASOUND RENAL COMPLETE December 29, 2020 2:50 PM     CLINICAL HISTORY: Kidney pain.     TECHNIQUE: Routine Bilateral Renal and Bladder " "Ultrasound.     COMPARISON: None.     FINDINGS:     RIGHT KIDNEY: 1.6 x 5.5 x 4.6 cm. Normal echogenicity and cortical  thickness. No hydronephrosis. Shadowing calculus in the right renal  collecting system measures up to 9 mm. There appears to be at least  one additional nonobstructing calcification in the right kidney.      LEFT KIDNEY: 12.6 x 4.1 x 4.5 cm. Normal echogenicity and cortical  thickness. No hydronephrosis. Shadowing calcifications are also noted  in the left kidney, largest of which measures up to 7 mm.      BLADDER: Normal.                                                                      IMPRESSION: Bilateral nephrolithiasis without evidence of hydronephrosis.    OB did u/s too and has had some ovarian cysts but they did not feel it was the cause of her symptoms.    Review of Systems         Objective    /80   Pulse 82   Temp 95.7  F (35.4  C) (Tympanic)   Ht 1.702 m (5' 7\")   Wt 118.4 kg (261 lb)   SpO2 99%   BMI 40.88 kg/m    Body mass index is 40.88 kg/m .  Physical Exam                   "

## 2021-01-15 ENCOUNTER — MYC MEDICAL ADVICE (OUTPATIENT)
Dept: FAMILY MEDICINE | Facility: CLINIC | Age: 36
End: 2021-01-15

## 2021-01-15 ENCOUNTER — OFFICE VISIT (OUTPATIENT)
Dept: FAMILY MEDICINE | Facility: CLINIC | Age: 36
End: 2021-01-15
Payer: COMMERCIAL

## 2021-01-15 VITALS
HEIGHT: 67 IN | TEMPERATURE: 95.7 F | OXYGEN SATURATION: 99 % | WEIGHT: 261 LBS | HEART RATE: 82 BPM | BODY MASS INDEX: 40.97 KG/M2 | DIASTOLIC BLOOD PRESSURE: 80 MMHG | SYSTOLIC BLOOD PRESSURE: 110 MMHG

## 2021-01-15 DIAGNOSIS — M54.50 ACUTE LEFT-SIDED LOW BACK PAIN WITHOUT SCIATICA: Primary | ICD-10-CM

## 2021-01-15 DIAGNOSIS — E03.9 HYPOTHYROIDISM, UNSPECIFIED TYPE: ICD-10-CM

## 2021-01-15 DIAGNOSIS — R10.9 LEFT FLANK PAIN: Primary | ICD-10-CM

## 2021-01-15 DIAGNOSIS — Z11.59 NEED FOR HEPATITIS C SCREENING TEST: ICD-10-CM

## 2021-01-15 DIAGNOSIS — M67.431 GANGLION CYST OF WRIST, RIGHT: ICD-10-CM

## 2021-01-15 LAB
HCV AB SERPL QL IA: NONREACTIVE
TSH SERPL DL<=0.005 MIU/L-ACNC: 3.09 MU/L (ref 0.4–4)

## 2021-01-15 PROCEDURE — 36415 COLL VENOUS BLD VENIPUNCTURE: CPT | Performed by: FAMILY MEDICINE

## 2021-01-15 PROCEDURE — 99214 OFFICE O/P EST MOD 30 MIN: CPT | Performed by: FAMILY MEDICINE

## 2021-01-15 PROCEDURE — 84443 ASSAY THYROID STIM HORMONE: CPT | Performed by: FAMILY MEDICINE

## 2021-01-15 PROCEDURE — 86803 HEPATITIS C AB TEST: CPT | Performed by: FAMILY MEDICINE

## 2021-01-15 RX ORDER — LEVOTHYROXINE SODIUM 112 UG/1
112 TABLET ORAL DAILY
Qty: 90 TABLET | Refills: 3 | Status: SHIPPED | OUTPATIENT
Start: 2021-01-15

## 2021-01-15 ASSESSMENT — MIFFLIN-ST. JEOR: SCORE: 1901.52

## 2021-01-15 NOTE — LETTER
[address] [address]  [phone]                  January 15, 2021    AUTHORIZATION TO RELEASE PROTECTED HEALTH INFORMATION    Patient Name:  Maryam Grier  YOB: 1985    Neil MRN:[id number]             This will authorize [facility]  to request information from :     [facility] F [phone], P [phone]    The following information is to be released for health maintenance and continuing care purposes with my primary care clinic:                 Visit Notes and test results including ultrasounds, labs, and pap smear results.     -I understand that I may revoke this authorization by written request at any time to the address listed at the top of this form.  I understand that the revocation will not apply to information that has already been released in response to this authorization.    -This authorization last for one year after the date you sign it.     -[facility] cannot prevent redisclosure of the information by the person or organization who receives your records under this authorization, and that information may not be covered by state and federal privacy protections after it is released. By signing this authorization, you release [employer] from any and all liability resulting from a redisclosure by the recipient.    ___________________________________          _____________  Signature of Patient/Authorized Person                     Date        ____________________________________________  (Reason if patient is unable to sign)

## 2021-01-15 NOTE — TELEPHONE ENCOUNTER
Notes from today unfinished  Routing to Dr. Faustin for further review/recommendations/orders.      Andressa Mcintosh RN  [facility]

## 2021-01-18 NOTE — RESULT ENCOUNTER NOTE
Dear Maryam,    Here is a summary of your recent test results:  -TSH (thyroid stimulating hormone) level is normal which indicates appropriate thyroid replacement dosing.  ADVISE: continuing same replacement dose and rechecking this in 12 months.  -Hepatitis C antibody screen test shows no signs of a previous hepatitis C infection.    For additional lab test information, [email or web address] is an excellent reference.    In addition, here is a list of due or overdue Health Maintenance reminders:  Preventive Care Visit due   PAP Smear due on 10/01/2020    Please call us at [phone] (or use [employer]) to address the above recommendations if needed.           Thank you very much for trusting me and [facility].     Have a peaceful day.    Healthy regards,  Kyler Faustin MD

## 2021-01-19 NOTE — TELEPHONE ENCOUNTER
Pt calling as she is at [facility], but they do not have the Tylenol 3 on file.  Can we send ASAP please/.  Carmen Henry

## 2021-01-19 NOTE — TELEPHONE ENCOUNTER
Medication Gerardo'd up, routing to Dr. Faustin to review and sign.    Yonatan SAWANT RN   [facility]

## 2021-02-22 DIAGNOSIS — Z11.59 ENCOUNTER FOR SCREENING FOR OTHER VIRAL DISEASES: ICD-10-CM

## 2021-03-02 ENCOUNTER — OFFICE VISIT (OUTPATIENT)
Dept: FAMILY MEDICINE | Facility: CLINIC | Age: 36
End: 2021-03-02
Payer: COMMERCIAL

## 2021-03-02 VITALS
WEIGHT: 267 LBS | TEMPERATURE: 96.7 F | OXYGEN SATURATION: 98 % | BODY MASS INDEX: 41.91 KG/M2 | HEART RATE: 96 BPM | HEIGHT: 67 IN | DIASTOLIC BLOOD PRESSURE: 70 MMHG | SYSTOLIC BLOOD PRESSURE: 100 MMHG

## 2021-03-02 DIAGNOSIS — Z01.818 PREOP GENERAL PHYSICAL EXAM: Primary | ICD-10-CM

## 2021-03-02 DIAGNOSIS — E66.01 MORBID OBESITY (H): ICD-10-CM

## 2021-03-02 DIAGNOSIS — E03.9 HYPOTHYROIDISM, UNSPECIFIED TYPE: ICD-10-CM

## 2021-03-02 DIAGNOSIS — N20.0 NEPHROLITHIASIS: ICD-10-CM

## 2021-03-02 PROBLEM — Z98.891 S/P PRIMARY LOW TRANSVERSE C-SECTION: Status: RESOLVED | Noted: 2018-12-29 | Resolved: 2021-03-02

## 2021-03-02 LAB — HCG UR QL: NEGATIVE

## 2021-03-02 PROCEDURE — 81025 URINE PREGNANCY TEST: CPT | Performed by: FAMILY MEDICINE

## 2021-03-02 PROCEDURE — 99214 OFFICE O/P EST MOD 30 MIN: CPT | Performed by: FAMILY MEDICINE

## 2021-03-02 ASSESSMENT — MIFFLIN-ST. JEOR: SCORE: 1928.73

## 2021-03-02 NOTE — H&P (VIEW-ONLY)
[address] [address]  Phone: [phone]  Primary Provider: No Ref-Primary, Physician  Pre-op Performing Provider: LEONOR BRYANT      PREOPERATIVE EVALUATION:  Today's date: 3/2/2021    Maryam Grier is a 35 year old female who presents for a preoperative evaluation.    Surgical Information:  Surgery/Procedure: CYSTOSCOPY, LEFT URETEROSCOPY, HOLMIUM LASER LITHOTRIPSY, LEFT STENT PLACEMENT, POSSIBLE RIGHT URETEROSCOPY, POSSIBLE RIGHT STENT PLACEMENT  Surgery Location: [facility]  Surgeon: Dr Franz  Surgery Date: 03/09/2021  Time of Surgery: 8:00 AM  Where patient plans to recover: At home with family  Fax number for surgical facility: Note does not need to be faxed, will be available electronically in Epic.    Type of Anesthesia Anticipated: General    Assessment & Plan     The proposed surgical procedure is considered INTERMEDIATE risk.    Preop general physical exam  - HCG Qual, Urine ([id number])    Nephrolithiasis  Off and on symptoms     Hypothyroidism, unspecified type  Controlled - continue medication.     Morbid obesity (H)  Diet and activity           Risks and Recommendations:  The patient has the following additional risks and recommendations for perioperative complications:   - No identified additional risk factors other than previously addressed    Medication Instructions:  Patient is to take all scheduled medications on the day of surgery    RECOMMENDATION:  APPROVAL GIVEN to proceed with proposed procedure, without further diagnostic evaluation.        Kyler Bryant MD     [address] [address]  Office: [phone]  [email or web address]     [phone]}    Subjective     HPI related to upcoming procedure: kidney stones - intermittent flare ups of pain that last ~ 1 day.      Preop Questions 3/2/2021   1. Have you ever had a heart attack or  stroke? No   2. Have you ever had surgery on your heart or blood vessels, such as a stent placement, a coronary artery bypass, or surgery on an artery in your head, neck, heart, or legs? No   3. Do you have chest pain with activity? No   4. Do you have a history of  heart failure? No   5. Do you currently have a cold, bronchitis or symptoms of other infection? No   6. Do you have a cough, shortness of breath, or wheezing? No   7. Do you or anyone in your family have previous history of blood clots? No   8. Do you or does anyone in your family have a serious bleeding problem such as prolonged bleeding following surgeries or cuts? No   9. Have you ever had problems with anemia or been told to take iron pills? No   10. Have you had any abnormal blood loss such as black, tarry or bloody stools, or abnormal vaginal bleeding? No   11. Have you ever had a blood transfusion? No   12. Are you willing to have a blood transfusion if it is medically needed before, during, or after your surgery? Yes   13. Have you or any of your relatives ever had problems with anesthesia? No   14. Do you have sleep apnea, excessive snoring or daytime drowsiness? No   15. Do you have any artifical heart valves or other implanted medical devices like a pacemaker, defibrillator, or continuous glucose monitor? No   16. Do you have artificial joints? No   17. Are you allergic to latex? No   18. Is there any chance that you may be pregnant? No     Health Care Directive:  Patient does not have a Health Care Directive or Living Will: no    Preoperative Review of :   reviewed - no record of controlled substances prescribed.      Status of Chronic Conditions:  HYPOTHYROIDISM - Patient has a longstanding history of chronic Hypothyroidism. Patient has been doing well, noting no tremor, insomnia, hair loss or changes in skin texture. Continues to take medications as directed, without adverse reactions or side effects. Last TSH   Lab Results    Component Value Date    TSH 3.09 01/15/2021   .    Review of Systems  Constitutional, neuro, ENT, endocrine, pulmonary, cardiac, gastrointestinal, genitourinary, musculoskeletal, integument and psychiatric systems are negative, except as otherwise noted.    Patient Active Problem List    Diagnosis Date Noted     Ganglion cyst of wrist, right 01/15/2021     Priority: Medium     Morbid obesity (H) 2019     Priority: Medium     Nephrolithiasis 2017     Priority: Medium     Hypothyroidism 2015     Priority: Medium      Past Medical History:   Diagnosis Date     S/P primary low transverse  2018     Thyroid disease      Past Surgical History:   Procedure Laterality Date      SECTION N/A 2018    Procedure:  SECTION;  Surgeon: Gaby Beck MD;  Location:  L+D      KNEE ARTHROSCOPY, MED/LAT MENISCUS REPAIR Bilateral     bilateral meniscus repairs     [place] FRAGMENTING OF KIDNEY STONE      [place]     Current Outpatient Medications   Medication Sig Dispense Refill     INCASSIA 0.35 MG tablet TAKE 1 2 TABLETS BY ORAL ROUTE ONCE DAILY AS NEEDED FOR CRAMPING       levonorgestrel (MIRENA) 20 MCG/24HR IUD 1 each by Intrauterine route once       levothyroxine (SYNTHROID/LEVOTHROID) 112 MCG tablet Take 1 tablet (112 mcg) by mouth daily 90 tablet 3       No Known Allergies     Social History     Tobacco Use     Smoking status: Never Smoker     Smokeless tobacco: Never Used   Substance Use Topics     Alcohol use: No     Family History   Problem Relation Age of Onset     Hypothyroidism Mother      Hypertension Mother      Hypertension Father      Hyperlipidemia Father      No Known Problems Brother      No Known Problems Daughter      Asthma Son      Autism Spectrum Disorder Son      Autism Spectrum Disorder Son      Cerebrovascular Disease Paternal Grandmother      Diabetes No family hx of      Coronary Artery Disease No family hx of      Breast Cancer No family  "hx of      Colon Cancer No family hx of      History   Drug Use No         Objective     /70   Pulse 96   Temp 96.7  F (35.9  C) (Tympanic)   Ht 1.702 m (5' 7\")   Wt 121.1 kg (267 lb)   SpO2 98%   BMI 41.82 kg/m      Physical Exam    GENERAL APPEARANCE: healthy, alert and no distress     EYES: EOMI, PERRL     HENT: ear canals and TM's normal and nose and mouth without ulcers or lesions     NECK: no adenopathy, no asymmetry, masses, or scars and thyroid normal to palpation     RESP: lungs clear to auscultation - no rales, rhonchi or wheezes     CV: regular rates and rhythm, normal S1 S2, no S3 or S4 and no murmur, click or rub     ABDOMEN:  soft, nontender, no HSM or masses and bowel sounds normal     MS: extremities normal- no gross deformities noted, no evidence of inflammation in joints, FROM in all extremities.     SKIN: no suspicious lesions or rashes     NEURO: Normal strength and tone, sensory exam grossly normal, mentation intact and speech normal     PSYCH: mentation appears normal. and affect normal/bright     LYMPHATICS: No cervical adenopathy    Recent Labs   Lab Test 10/09/20 02/10/20   POTASSIUM 5.1  --    CR 0.80  --    A1C  --  4.7*        Diagnostics:  HCG negative  No EKG required, no history of coronary heart disease, significant arrhythmia, peripheral arterial disease or other structural heart disease.    Revised Cardiac Risk Index (RCRI):  The patient has the following serious cardiovascular risks for perioperative complications:   - No serious cardiac risks = 0 points     RCRI Interpretation: 0 points: Class I (very low risk - 0.4% complication rate)       Signed Electronically by: Nabor Faustin MD  Copy of this evaluation report is provided to requesting physician.    [facility] [place] Guidelines    Revised Cardiac Risk Index   "

## 2021-03-02 NOTE — PATIENT INSTRUCTIONS

## 2021-03-02 NOTE — PROGRESS NOTES
[address] [address]  Phone: [phone]  Primary Provider: No Ref-Primary, Physician  Pre-op Performing Provider: LEONOR BRYANT      PREOPERATIVE EVALUATION:  Today's date: 3/2/2021    Maryam Grier is a 35 year old female who presents for a preoperative evaluation.    Surgical Information:  Surgery/Procedure: CYSTOSCOPY, LEFT URETEROSCOPY, HOLMIUM LASER LITHOTRIPSY, LEFT STENT PLACEMENT, POSSIBLE RIGHT URETEROSCOPY, POSSIBLE RIGHT STENT PLACEMENT  Surgery Location: [facility]  Surgeon: Dr Franz  Surgery Date: 03/09/2021  Time of Surgery: 8:00 AM  Where patient plans to recover: At home with family  Fax number for surgical facility: Note does not need to be faxed, will be available electronically in Epic.    Type of Anesthesia Anticipated: General    Assessment & Plan     The proposed surgical procedure is considered INTERMEDIATE risk.    Preop general physical exam  - HCG Qual, Urine ([id number])    Nephrolithiasis  Off and on symptoms     Hypothyroidism, unspecified type  Controlled - continue medication.     Morbid obesity (H)  Diet and activity           Risks and Recommendations:  The patient has the following additional risks and recommendations for perioperative complications:   - No identified additional risk factors other than previously addressed    Medication Instructions:  Patient is to take all scheduled medications on the day of surgery    RECOMMENDATION:  APPROVAL GIVEN to proceed with proposed procedure, without further diagnostic evaluation.        Kyler Bryant MD     [address] [address]  Office: [phone]  [email or web address]     [phone]}    Subjective     HPI related to upcoming procedure: kidney stones - intermittent flare ups of pain that last ~ 1 day.      Preop Questions 3/2/2021   1. Have you ever had a heart attack or  stroke? No   2. Have you ever had surgery on your heart or blood vessels, such as a stent placement, a coronary artery bypass, or surgery on an artery in your head, neck, heart, or legs? No   3. Do you have chest pain with activity? No   4. Do you have a history of  heart failure? No   5. Do you currently have a cold, bronchitis or symptoms of other infection? No   6. Do you have a cough, shortness of breath, or wheezing? No   7. Do you or anyone in your family have previous history of blood clots? No   8. Do you or does anyone in your family have a serious bleeding problem such as prolonged bleeding following surgeries or cuts? No   9. Have you ever had problems with anemia or been told to take iron pills? No   10. Have you had any abnormal blood loss such as black, tarry or bloody stools, or abnormal vaginal bleeding? No   11. Have you ever had a blood transfusion? No   12. Are you willing to have a blood transfusion if it is medically needed before, during, or after your surgery? Yes   13. Have you or any of your relatives ever had problems with anesthesia? No   14. Do you have sleep apnea, excessive snoring or daytime drowsiness? No   15. Do you have any artifical heart valves or other implanted medical devices like a pacemaker, defibrillator, or continuous glucose monitor? No   16. Do you have artificial joints? No   17. Are you allergic to latex? No   18. Is there any chance that you may be pregnant? No     Health Care Directive:  Patient does not have a Health Care Directive or Living Will: no    Preoperative Review of :   reviewed - no record of controlled substances prescribed.      Status of Chronic Conditions:  HYPOTHYROIDISM - Patient has a longstanding history of chronic Hypothyroidism. Patient has been doing well, noting no tremor, insomnia, hair loss or changes in skin texture. Continues to take medications as directed, without adverse reactions or side effects. Last TSH   Lab Results    Component Value Date    TSH 3.09 01/15/2021   .    Review of Systems  Constitutional, neuro, ENT, endocrine, pulmonary, cardiac, gastrointestinal, genitourinary, musculoskeletal, integument and psychiatric systems are negative, except as otherwise noted.    Patient Active Problem List    Diagnosis Date Noted     Ganglion cyst of wrist, right 01/15/2021     Priority: Medium     Morbid obesity (H) 2019     Priority: Medium     Nephrolithiasis 2017     Priority: Medium     Hypothyroidism 2015     Priority: Medium      Past Medical History:   Diagnosis Date     S/P primary low transverse  2018     Thyroid disease      Past Surgical History:   Procedure Laterality Date      SECTION N/A 2018    Procedure:  SECTION;  Surgeon: Gaby Beck MD;  Location:  L+D      KNEE ARTHROSCOPY, MED/LAT MENISCUS REPAIR Bilateral     bilateral meniscus repairs     [place] FRAGMENTING OF KIDNEY STONE      [place]     Current Outpatient Medications   Medication Sig Dispense Refill     INCASSIA 0.35 MG tablet TAKE 1 2 TABLETS BY ORAL ROUTE ONCE DAILY AS NEEDED FOR CRAMPING       levonorgestrel (MIRENA) 20 MCG/24HR IUD 1 each by Intrauterine route once       levothyroxine (SYNTHROID/LEVOTHROID) 112 MCG tablet Take 1 tablet (112 mcg) by mouth daily 90 tablet 3       No Known Allergies     Social History     Tobacco Use     Smoking status: Never Smoker     Smokeless tobacco: Never Used   Substance Use Topics     Alcohol use: No     Family History   Problem Relation Age of Onset     Hypothyroidism Mother      Hypertension Mother      Hypertension Father      Hyperlipidemia Father      No Known Problems Brother      No Known Problems Daughter      Asthma Son      Autism Spectrum Disorder Son      Autism Spectrum Disorder Son      Cerebrovascular Disease Paternal Grandmother      Diabetes No family hx of      Coronary Artery Disease No family hx of      Breast Cancer No family  "hx of      Colon Cancer No family hx of      History   Drug Use No         Objective     /70   Pulse 96   Temp 96.7  F (35.9  C) (Tympanic)   Ht 1.702 m (5' 7\")   Wt 121.1 kg (267 lb)   SpO2 98%   BMI 41.82 kg/m      Physical Exam    GENERAL APPEARANCE: healthy, alert and no distress     EYES: EOMI, PERRL     HENT: ear canals and TM's normal and nose and mouth without ulcers or lesions     NECK: no adenopathy, no asymmetry, masses, or scars and thyroid normal to palpation     RESP: lungs clear to auscultation - no rales, rhonchi or wheezes     CV: regular rates and rhythm, normal S1 S2, no S3 or S4 and no murmur, click or rub     ABDOMEN:  soft, nontender, no HSM or masses and bowel sounds normal     MS: extremities normal- no gross deformities noted, no evidence of inflammation in joints, FROM in all extremities.     SKIN: no suspicious lesions or rashes     NEURO: Normal strength and tone, sensory exam grossly normal, mentation intact and speech normal     PSYCH: mentation appears normal. and affect normal/bright     LYMPHATICS: No cervical adenopathy    Recent Labs   Lab Test 10/09/20 02/10/20   POTASSIUM 5.1  --    CR 0.80  --    A1C  --  4.7*        Diagnostics:  HCG negative  No EKG required, no history of coronary heart disease, significant arrhythmia, peripheral arterial disease or other structural heart disease.    Revised Cardiac Risk Index (RCRI):  The patient has the following serious cardiovascular risks for perioperative complications:   - No serious cardiac risks = 0 points     RCRI Interpretation: 0 points: Class I (very low risk - 0.4% complication rate)       Signed Electronically by: Nabor Faustin MD  Copy of this evaluation report is provided to requesting physician.    [facility] Guidelines    Revised Cardiac Risk Index   "

## 2021-03-02 NOTE — RESULT ENCOUNTER NOTE
Dear Maryam,    Here is a summary of your recent test results:  -HCG test showed no signs of pregnancy.            Thank you very much for trusting me and [facility].     Have a peaceful day.    Healthy regards,  Kyler Faustin MD

## 2021-03-05 DIAGNOSIS — Z11.59 ENCOUNTER FOR SCREENING FOR OTHER VIRAL DISEASES: ICD-10-CM

## 2021-03-05 LAB
LABORATORY COMMENT REPORT: NORMAL
SARS-COV-2 RNA RESP QL NAA+PROBE: NEGATIVE
SARS-COV-2 RNA RESP QL NAA+PROBE: NORMAL
SPECIMEN SOURCE: NORMAL
SPECIMEN SOURCE: NORMAL

## 2021-03-05 PROCEDURE — U0003 INFECTIOUS AGENT DETECTION BY NUCLEIC ACID (DNA OR RNA); SEVERE ACUTE RESPIRATORY SYNDROME CORONAVIRUS 2 (SARS-COV-2) (CORONAVIRUS DISEASE [COVID-19]), AMPLIFIED PROBE TECHNIQUE, MAKING USE OF HIGH THROUGHPUT TECHNOLOGIES AS DESCRIBED BY CMS-2020-01-R: HCPCS | Performed by: UROLOGY

## 2021-03-05 PROCEDURE — U0005 INFEC AGEN DETEC AMPLI PROBE: HCPCS | Performed by: UROLOGY

## 2021-03-07 ENCOUNTER — HEALTH MAINTENANCE LETTER (OUTPATIENT)
Age: 36
End: 2021-03-07

## 2021-03-09 ENCOUNTER — APPOINTMENT (OUTPATIENT)
Dept: GENERAL RADIOLOGY | Facility: CLINIC | Age: 36
End: 2021-03-09
Attending: UROLOGY
Payer: COMMERCIAL

## 2021-03-09 ENCOUNTER — ANESTHESIA EVENT (OUTPATIENT)
Dept: SURGERY | Facility: CLINIC | Age: 36
End: 2021-03-09
Payer: COMMERCIAL

## 2021-03-09 ENCOUNTER — HOSPITAL ENCOUNTER (OUTPATIENT)
Facility: CLINIC | Age: 36
Discharge: HOME | End: 2021-03-09
Attending: UROLOGY | Admitting: UROLOGY
Payer: COMMERCIAL

## 2021-03-09 ENCOUNTER — ANESTHESIA (OUTPATIENT)
Dept: SURGERY | Facility: CLINIC | Age: 36
End: 2021-03-09
Payer: COMMERCIAL

## 2021-03-09 VITALS
WEIGHT: 271.5 LBS | HEIGHT: 67 IN | HEART RATE: 95 BPM | TEMPERATURE: 97.9 F | RESPIRATION RATE: 12 BRPM | BODY MASS INDEX: 42.61 KG/M2 | DIASTOLIC BLOOD PRESSURE: 92 MMHG | SYSTOLIC BLOOD PRESSURE: 144 MMHG | OXYGEN SATURATION: 96 %

## 2021-03-09 DIAGNOSIS — N20.0 NEPHROLITHIASIS: Primary | ICD-10-CM

## 2021-03-09 LAB
COPATH REPORT: NORMAL
HCG UR QL: NEGATIVE

## 2021-03-09 PROCEDURE — 258N000001 HC RX 258: Performed by: UROLOGY

## 2021-03-09 PROCEDURE — C1894 INTRO/SHEATH, NON-LASER: HCPCS | Performed by: UROLOGY

## 2021-03-09 PROCEDURE — 250N000011 HC RX IP 250 OP 636: Performed by: UROLOGY

## 2021-03-09 PROCEDURE — 999N000179 XR SURGERY CARM FLUORO LESS THAN 5 MIN W STILLS

## 2021-03-09 PROCEDURE — 88300 SURGICAL PATH GROSS: CPT | Mod: 26 | Performed by: PATHOLOGY

## 2021-03-09 PROCEDURE — 81025 URINE PREGNANCY TEST: CPT | Performed by: ANESTHESIOLOGY

## 2021-03-09 PROCEDURE — 250N000009 HC RX 250: Performed by: UROLOGY

## 2021-03-09 PROCEDURE — C2617 STENT, NON-COR, TEM W/O DEL: HCPCS | Performed by: UROLOGY

## 2021-03-09 PROCEDURE — 710N000012 HC RECOVERY PHASE 2, PER MINUTE: Performed by: UROLOGY

## 2021-03-09 PROCEDURE — 272N000001 HC OR GENERAL SUPPLY STERILE: Performed by: UROLOGY

## 2021-03-09 PROCEDURE — 258N000003 HC RX IP 258 OP 636: Performed by: NURSE ANESTHETIST, CERTIFIED REGISTERED

## 2021-03-09 PROCEDURE — 255N000002 HC RX 255 OP 636: Performed by: UROLOGY

## 2021-03-09 PROCEDURE — 360N000076 HC SURGERY LEVEL 3, PER MIN: Performed by: UROLOGY

## 2021-03-09 PROCEDURE — 250N000011 HC RX IP 250 OP 636: Performed by: NURSE ANESTHETIST, CERTIFIED REGISTERED

## 2021-03-09 PROCEDURE — C1769 GUIDE WIRE: HCPCS | Performed by: UROLOGY

## 2021-03-09 PROCEDURE — 250N000009 HC RX 250: Performed by: NURSE ANESTHETIST, CERTIFIED REGISTERED

## 2021-03-09 PROCEDURE — 250N000025 HC SEVOFLURANE, PER MIN: Performed by: UROLOGY

## 2021-03-09 PROCEDURE — 710N000009 HC RECOVERY PHASE 1, LEVEL 1, PER MIN: Performed by: UROLOGY

## 2021-03-09 PROCEDURE — C1758 CATHETER, URETERAL: HCPCS | Performed by: UROLOGY

## 2021-03-09 PROCEDURE — 999N000141 HC STATISTIC PRE-PROCEDURE NURSING ASSESSMENT: Performed by: UROLOGY

## 2021-03-09 PROCEDURE — 370N000017 HC ANESTHESIA TECHNICAL FEE, PER MIN: Performed by: UROLOGY

## 2021-03-09 PROCEDURE — 88300 SURGICAL PATH GROSS: CPT | Mod: TC | Performed by: UROLOGY

## 2021-03-09 PROCEDURE — 250N000011 HC RX IP 250 OP 636: Performed by: ANESTHESIOLOGY

## 2021-03-09 PROCEDURE — 82365 CALCULUS SPECTROSCOPY: CPT | Performed by: UROLOGY

## 2021-03-09 DEVICE — URETERAL STENT
Type: IMPLANTABLE DEVICE | Site: URETHRA | Status: FUNCTIONAL
Brand: POLARIS™ ULTRA

## 2021-03-09 RX ORDER — CEFAZOLIN SODIUM 2 G/100ML
2 INJECTION, SOLUTION INTRAVENOUS
Status: DISCONTINUED | OUTPATIENT
Start: 2021-03-09 | End: 2021-03-09

## 2021-03-09 RX ORDER — FENTANYL CITRATE 50 UG/ML
25-50 INJECTION, SOLUTION INTRAMUSCULAR; INTRAVENOUS
Status: DISCONTINUED | OUTPATIENT
Start: 2021-03-09 | End: 2021-03-09 | Stop reason: HOSPADM

## 2021-03-09 RX ORDER — LIDOCAINE HYDROCHLORIDE 20 MG/ML
INJECTION, SOLUTION INFILTRATION; PERINEURAL PRN
Status: DISCONTINUED | OUTPATIENT
Start: 2021-03-09 | End: 2021-03-09

## 2021-03-09 RX ORDER — ONDANSETRON 2 MG/ML
INJECTION INTRAMUSCULAR; INTRAVENOUS PRN
Status: DISCONTINUED | OUTPATIENT
Start: 2021-03-09 | End: 2021-03-09

## 2021-03-09 RX ORDER — CEFAZOLIN SODIUM IN 0.9 % NACL 3 G/100 ML
3 INTRAVENOUS SOLUTION, PIGGYBACK (ML) INTRAVENOUS
Status: COMPLETED | OUTPATIENT
Start: 2021-03-09 | End: 2021-03-09

## 2021-03-09 RX ORDER — ONDANSETRON 4 MG/1
4 TABLET, ORALLY DISINTEGRATING ORAL EVERY 30 MIN PRN
Status: DISCONTINUED | OUTPATIENT
Start: 2021-03-09 | End: 2021-03-09 | Stop reason: HOSPADM

## 2021-03-09 RX ORDER — SODIUM CHLORIDE, SODIUM LACTATE, POTASSIUM CHLORIDE, CALCIUM CHLORIDE 600; 310; 30; 20 MG/100ML; MG/100ML; MG/100ML; MG/100ML
INJECTION, SOLUTION INTRAVENOUS CONTINUOUS PRN
Status: DISCONTINUED | OUTPATIENT
Start: 2021-03-09 | End: 2021-03-09

## 2021-03-09 RX ORDER — ACETAMINOPHEN 325 MG/1
650 TABLET ORAL ONCE
Status: DISCONTINUED | OUTPATIENT
Start: 2021-03-09 | End: 2021-03-09 | Stop reason: HOSPADM

## 2021-03-09 RX ORDER — HYDROMORPHONE HYDROCHLORIDE 1 MG/ML
.3-.5 INJECTION, SOLUTION INTRAMUSCULAR; INTRAVENOUS; SUBCUTANEOUS EVERY 5 MIN PRN
Status: DISCONTINUED | OUTPATIENT
Start: 2021-03-09 | End: 2021-03-09 | Stop reason: HOSPADM

## 2021-03-09 RX ORDER — NALOXONE HYDROCHLORIDE 0.4 MG/ML
0.2 INJECTION, SOLUTION INTRAMUSCULAR; INTRAVENOUS; SUBCUTANEOUS
Status: DISCONTINUED | OUTPATIENT
Start: 2021-03-09 | End: 2021-03-09 | Stop reason: HOSPADM

## 2021-03-09 RX ORDER — IOPAMIDOL 612 MG/ML
INJECTION, SOLUTION INTRAVASCULAR PRN
Status: DISCONTINUED | OUTPATIENT
Start: 2021-03-09 | End: 2021-03-09 | Stop reason: HOSPADM

## 2021-03-09 RX ORDER — TOLTERODINE TARTRATE 2 MG/1
2 TABLET, EXTENDED RELEASE ORAL EVERY 12 HOURS PRN
Qty: 30 TABLET | Refills: 0 | Status: SHIPPED | OUTPATIENT
Start: 2021-03-09

## 2021-03-09 RX ORDER — ONDANSETRON 2 MG/ML
4 INJECTION INTRAMUSCULAR; INTRAVENOUS EVERY 30 MIN PRN
Status: DISCONTINUED | OUTPATIENT
Start: 2021-03-09 | End: 2021-03-09 | Stop reason: HOSPADM

## 2021-03-09 RX ORDER — DEXAMETHASONE SODIUM PHOSPHATE 4 MG/ML
INJECTION, SOLUTION INTRA-ARTICULAR; INTRALESIONAL; INTRAMUSCULAR; INTRAVENOUS; SOFT TISSUE PRN
Status: DISCONTINUED | OUTPATIENT
Start: 2021-03-09 | End: 2021-03-09

## 2021-03-09 RX ORDER — SODIUM CHLORIDE, SODIUM LACTATE, POTASSIUM CHLORIDE, CALCIUM CHLORIDE 600; 310; 30; 20 MG/100ML; MG/100ML; MG/100ML; MG/100ML
INJECTION, SOLUTION INTRAVENOUS CONTINUOUS
Status: DISCONTINUED | OUTPATIENT
Start: 2021-03-09 | End: 2021-03-09 | Stop reason: HOSPADM

## 2021-03-09 RX ORDER — FENTANYL CITRATE 50 UG/ML
INJECTION, SOLUTION INTRAMUSCULAR; INTRAVENOUS PRN
Status: DISCONTINUED | OUTPATIENT
Start: 2021-03-09 | End: 2021-03-09

## 2021-03-09 RX ORDER — PROPOFOL 10 MG/ML
INJECTION, EMULSION INTRAVENOUS PRN
Status: DISCONTINUED | OUTPATIENT
Start: 2021-03-09 | End: 2021-03-09

## 2021-03-09 RX ORDER — HYDROCODONE BITARTRATE AND ACETAMINOPHEN 5; 325 MG/1; MG/1
1 TABLET ORAL EVERY 6 HOURS PRN
Qty: 10 TABLET | Refills: 0 | Status: SHIPPED | OUTPATIENT
Start: 2021-03-09 | End: 2021-03-12

## 2021-03-09 RX ORDER — HYDROCODONE BITARTRATE AND ACETAMINOPHEN 5; 325 MG/1; MG/1
1 TABLET ORAL ONCE
Status: DISCONTINUED | OUTPATIENT
Start: 2021-03-09 | End: 2021-03-09 | Stop reason: HOSPADM

## 2021-03-09 RX ORDER — NALOXONE HYDROCHLORIDE 0.4 MG/ML
0.4 INJECTION, SOLUTION INTRAMUSCULAR; INTRAVENOUS; SUBCUTANEOUS
Status: DISCONTINUED | OUTPATIENT
Start: 2021-03-09 | End: 2021-03-09 | Stop reason: HOSPADM

## 2021-03-09 RX ORDER — MAGNESIUM HYDROXIDE 1200 MG/15ML
LIQUID ORAL PRN
Status: DISCONTINUED | OUTPATIENT
Start: 2021-03-09 | End: 2021-03-09 | Stop reason: HOSPADM

## 2021-03-09 RX ADMIN — HYDROMORPHONE HYDROCHLORIDE 0.5 MG: 1 INJECTION, SOLUTION INTRAMUSCULAR; INTRAVENOUS; SUBCUTANEOUS at 11:39

## 2021-03-09 RX ADMIN — PROCHLORPERAZINE EDISYLATE 5 MG: 5 INJECTION INTRAMUSCULAR; INTRAVENOUS at 11:25

## 2021-03-09 RX ADMIN — MIDAZOLAM 2 MG: 1 INJECTION INTRAMUSCULAR; INTRAVENOUS at 08:09

## 2021-03-09 RX ADMIN — ONDANSETRON 4 MG: 2 INJECTION INTRAMUSCULAR; INTRAVENOUS at 10:18

## 2021-03-09 RX ADMIN — DEXAMETHASONE SODIUM PHOSPHATE 4 MG: 4 INJECTION, SOLUTION INTRA-ARTICULAR; INTRALESIONAL; INTRAMUSCULAR; INTRAVENOUS; SOFT TISSUE at 08:18

## 2021-03-09 RX ADMIN — LIDOCAINE HYDROCHLORIDE 60 MG: 20 INJECTION, SOLUTION INFILTRATION; PERINEURAL at 08:12

## 2021-03-09 RX ADMIN — SODIUM CHLORIDE, POTASSIUM CHLORIDE, SODIUM LACTATE AND CALCIUM CHLORIDE: 600; 310; 30; 20 INJECTION, SOLUTION INTRAVENOUS at 08:08

## 2021-03-09 RX ADMIN — FENTANYL CITRATE 50 MCG: 0.05 INJECTION, SOLUTION INTRAMUSCULAR; INTRAVENOUS at 11:01

## 2021-03-09 RX ADMIN — FENTANYL CITRATE 25 MCG: 50 INJECTION, SOLUTION INTRAMUSCULAR; INTRAVENOUS at 08:28

## 2021-03-09 RX ADMIN — ONDANSETRON 4 MG: 2 INJECTION INTRAMUSCULAR; INTRAVENOUS at 08:18

## 2021-03-09 RX ADMIN — FENTANYL CITRATE 50 MCG: 50 INJECTION, SOLUTION INTRAMUSCULAR; INTRAVENOUS at 08:12

## 2021-03-09 RX ADMIN — FENTANYL CITRATE 25 MCG: 50 INJECTION, SOLUTION INTRAMUSCULAR; INTRAVENOUS at 09:09

## 2021-03-09 RX ADMIN — Medication 3 G: at 08:17

## 2021-03-09 RX ADMIN — PROPOFOL 200 MG: 10 INJECTION, EMULSION INTRAVENOUS at 08:12

## 2021-03-09 ASSESSMENT — LIFESTYLE VARIABLES: TOBACCO_USE: 0

## 2021-03-09 ASSESSMENT — MIFFLIN-ST. JEOR: SCORE: 1949.15

## 2021-03-09 NOTE — ANESTHESIA CARE TRANSFER NOTE
Patient: Maryam Grier    Procedure(s):  CYSTOSCOPY, LEFT URETEROSCOPY, HOLMIUM LASER LITHOTRIPSY, LEFT RETROGRADE PYELOGRAM, LEFT STENT PLACEMENT, STONE BASKETING    Diagnosis: Kidney stone [N20.0]  Diagnosis Additional Information: No value filed.    Anesthesia Type:   General     Note:    Oropharynx: oropharynx clear of all foreign objects  Level of Consciousness: awake  Oxygen Supplementation: face mask    Independent Airway: airway patency satisfactory and stable  Dentition: dentition unchanged  Vital Signs Stable: post-procedure vital signs reviewed and stable  Report to RN Given: handoff report given  Patient transferred to: PACU  Comments:     At end of procedure, spontaneous respirations, adequate tidal volumes, followed commands to voice, LMA removed atraumatically, airway patent after LMA removal. Oxygen via facemask at 6 liters per minute to PACU. Oxygen tubing connected to wall O2 in PACU, SpO2, NiBP, and EKG monitors and alarms on and functioning, Binh Hugger warmer connected to patient gown, report on patient's clinical status given to PACU RN, RN questions answered.      Handoff Report: Identifed the Patient, Identified the Reponsible Provider, Reviewed the pertinent medical history, Discussed the surgical course, Reviewed Intra-OP anesthesia mangement and issues during anesthesia, Set expectations for post-procedure period and Allowed opportunity for questions and acknowledgement of understanding      Vitals: (Last set prior to Anesthesia Care Transfer)  CRNA VITALS  3/9/2021 0848 - 3/9/2021 0924      3/9/2021             Pulse:  101    SpO2:  96 %    Resp Rate (observed):  18    Resp Rate (set):  10        Electronically Signed By: THEO Aguilar CRNA  March 9, 2021  9:24 AM

## 2021-03-09 NOTE — BRIEF OP NOTE
[facility] Brief Operative Note    Pre-operative diagnosis: Kidney stone [N20.0]   Post-operative diagnosis: Same   Procedure: Procedure(s):  CYSTOSCOPY, LEFT URETEROSCOPY, HOLMIUM LASER LITHOTRIPSY, LEFT RETROGRADE PYELOGRAM, LEFT STENT PLACEMENT, STONE BASKETING     Surgeon: Didier Franz MD        Anesthesia: General mask     Estimated blood loss: None   Total IV fluids:  ml   Blood transfusion: No transfusion was given during surgery   Total urine output: Not measured   Drains: None   Specimens: Left kidney stone   Implants: 6F x 26 cm left ureteral stent   Complications: None   Condition: Patient taken to recovery in stable condition.     Findings:   7mm and 5 mm stones fragmented and basketed, numerous small stones basketed.  Several Matteo's plaques..  Complications: None.  Follow-up:  Stent removal in 1-2 weeks unless wants to schedule right ureteroscopy, then can be removed in OR.    Didier Franz MD  3/9/2021

## 2021-03-09 NOTE — DISCHARGE INSTRUCTIONS
Same Day Surgery Discharge Instructions for  Sedation and General Anesthesia       It's not unusual to feel dizzy, light-headed or faint for up to 24 hours after surgery or while taking pain medication.  If you have these symptoms: sit for a few minutes before standing and have someone assist you when you get up to walk or use the bathroom.      You should rest and relax for the next 24 hours. We recommend you make arrangements to have an adult stay with you for at least 24 hours after your discharge.  Avoid hazardous and strenuous activity.      DO NOT DRIVE any vehicle or operate mechanical equipment for 24 hours following the end of your surgery.  Even though you may feel normal, your reactions may be affected by the medication you have received.      Do not drink alcoholic beverages for 24 hours following surgery.       Slowly progress to your regular diet as you feel able. It's not unusual to feel nauseated and/or vomit after receiving anesthesia.  If you develop these symptoms, drink clear liquids (apple juice, ginger ale, broth, 7-up, etc. ) until you feel better.  If your nausea and vomiting persists for 24 hours, please notify your surgeon.        All narcotic pain medications, along with inactivity and anesthesia, can cause constipation. Drinking plenty of liquids and increasing fiber intake will help.      For any questions of a medical nature, call your surgeon.      Do not make important decisions for 24 hours.      If you had general anesthesia, you may have a sore throat for a couple of days related to the breathing tube used during surgery.  You may use Cepacol lozenges to help with this discomfort.  If it worsens or if you develop a fever, contact your surgeon.       If you feel your pain is not well managed with the pain medications prescribed by your surgeon, please contact your surgeon's office to let them know so they can address your concerns.           CoVid 19 Information    We want to give  you information regarding Covid. Please consult your primary care provider with any questions you might have.     Patient who have symptoms (cough, fever, or shortness of breath), need to isolate for 7 days from when symptoms started OR 72 hours after fever resolves (without fever reducing medications) AND improvement of respiratory symptoms (whichever is longer).      Isolate yourself at home (in own room/own bathroom if possible)    Do Not allow any visitors    Do Not go to work or school    Do Not go to Sikh,  centers, shopping, or other public places.    Do Not shake hands.    Avoid close and intimate contact with others (hugging, kissing).    Follow CDC recommendations for household cleaning of frequently touched services.     After the initial 7 days, continue to isolate yourself from household members as much as possible. To continue decrease the risk of community spread and exposure, you and any members of your household should limit activities in public for 14 days after starting home isolation.     You can reference the following CDC link for helpful home isolation/care tips:  https://www.cdc.gov/coronavirus/2019-ncov/downloads/10Things.pdf    Protect Others:    Cover Your Mouth and Nose with a mask, disposable tissue or wash cloth to avoid spreading germs to others.    Wash your hands and face frequently with soap and water    Call Your Primary Doctor If: Breathing difficulty develops or you become worse.    For more information about COVID19 and options for caring for yourself at home, please visit the CDC website at https://www.cdc.gov/coronavirus/2019-ncov/about/steps-when-sick.html  For more options for care at [facility], please visit our website at [email or web address]          Cystoscopy, Holmium Laser with Stent Placement Discharge Instructions    Holmium laser lithotripsy was used to break up your kidney stone(s). It is normal to have visible blood  in the urine, burning, urgency and frequency following this procedure. These symptoms may last for a few days to weeks.     Diet:    To help pass stone fragments and clear the blood out of the urine, it is important to drink 6-8 glasses of fluids per day at home - at least 3-4 glasses should be water.       Return to the diet that you were on before the procedure, unless you are given specific diet instructions.    Activity:    Walk short distances and increase as your strength allows.    You may climb stairs.    Do not do strenuous exercise or heavy lifting until approved by surgeon.    Do not drive while taking narcotic pain medications.    Bathing:    You may take a shower.          Stent information    During surgery, a stent was placed in the ureter.  The ureter is the tube that drains urine from the kidney to the bladder.  The stent is placed to dilate (open) the ureter so the stone fragments can pass easily through the ureter or to decrease ureteral swelling after surgery, or to relieve an obstruction. The stent is made of rubber. The upper end of the stent curls in the kidney while the lower end rests in the bladder.    While the stent is in place you may experience the following symptoms:    Blood and/or small blood clots in urine.    Bladder spasm (frequency and urgency of urination).    Discomfort or aching in the back or side where the stent is.    Burning or discomfort at the end of urine stream.    To decrease these symptoms you should:    Take pain medication as prescribed.    Drink plenty of fluids.    If you experience pain at the end of urination try not emptying your bladder completely.    If having discomfort in back or side, decrease activity.    Call your physician if these signs/symptoms are present:    Pain that is not relieved by a short rest or ordered pain medications.    Temperature at or above 101.0 F or chills.    Inability or difficulty urinating.     Excessive blood in urine.    Any  questions or concerns.          **If you have questions or concerns about your procedure,  call Dr. Franz at [phone]**

## 2021-03-09 NOTE — OP NOTE
Procedure Date: 03/09/2021      PREOPERATIVE DIAGNOSIS:  Bilateral nephrolithiasis.      POSTOPERATIVE DIAGNOSIS:  Bilateral nephrolithiasis.      PROCEDURES PERFORMED:   1.  Left ureteroscopy with holmium laser lithotripsy and stone basketing.   2.  Left retrograde pyelogram.   3.  Left ureteral stent placement.   4.  Intraoperative interpretation of fluoroscopic images.      OPERATIVE INDICATIONS:  The patient is a 35-year-old woman who presented with left flank and left lower quadrant pain.  Imaging revealed numerous bilateral nonobstructing stones.  As she was symptomatic on the left side, she desired to treat her left-sided stones.      DESCRIPTION OF PROCEDURE:  After properly identifying the patient and verifying informed consent, she was brought to the operating room in stable condition.  After sufficient induction of general anesthetic, she was placed in lithotomy position and her genitalia and perineum were prepped and draped in the usual fashion.  The case was begun by inserting a well-lubricated cystoscope in the patient's bladder under direct vision.  The urethra was unremarkable.  Bladder was surveyed in its entirety and free from tumor, stones or diverticula.  The left ureteral orifice was identified and intubated with a Sensor guidewire.  This was advanced to the renal pelvis.  There were numerous opacifications in the collecting system consistent with the patient's known stones.  I then put a 10 K[place] dual-lumen catheter over the wire and injected contrast to perform a left retrograde pyelogram.  There was minimal hydronephrosis.  There were no abnormal filling defects.  I then put a Super Stiff wire through the dual-lumen catheter and then advanced an 11/13 ureteral access sheath over the Super Stiff wire.  I then introduced the flexible ureteroscope and performed a pyeloscopy.  There were numerous stones seen in the kidney.  There was an approximately 7 mm stone in a midpole calyx, as well as a 5  mm stone.  There were numerous smaller stones and numerous Matteo's plaques.  I then used the 200 micron holmium laser fiber to fragment the larger stones.  I then used the [place] Halo basket to retrieve the fragments, as well as the smaller stones that did not require fragmentation.  I then performed a systematic pyeloscopy, and there were no significant stone fragments remaining, only a small amount of stone dust and some Matteo's plaques.  Pullback ureteroscopy was then performed, and there were no stones in the ureter and there was no evidence of injury to the ureter.  I then injected a small amount of contrast again to delineate the collecting system.  I then put a 6-[place] x 26 cm double-J ureteral stent over the Sensor guidewire with the assistance of fluoroscopy.  Excellent proximal distal coils were visualized.  At this point, the patient was awoken from anesthesia, brought to recovery in stable condition.  There was minimal blood loss.  There were no apparent complications.      FOLLOWUP:  The patient can follow up in 1-2 weeks in the clinic for stent removal, unless she desires ureteroscopy to treat her right-sided kidney stones in which case the left stent can be removed at that time.         VICENTE GARCÍA MD             D: 2021   T: 2021   MTNisha HERNANDEZ      Name:     JULIA GIL   MRN:      [id number]        Account:        [id number]   :      1985           Procedure Date: 2021      Document: [id number]

## 2021-03-09 NOTE — ANESTHESIA POSTPROCEDURE EVALUATION
Patient: Maryam Grier    Procedure(s):  CYSTOSCOPY, LEFT URETEROSCOPY, HOLMIUM LASER LITHOTRIPSY, LEFT RETROGRADE PYELOGRAM, LEFT STENT PLACEMENT, STONE BASKETING    Diagnosis:Kidney stone [N20.0]  Diagnosis Additional Information: No value filed.    Anesthesia Type:  General    Note:     Postop Pain Control: Uneventful            Sign Out: Well controlled pain   PONV: No   Neuro/Psych: Uneventful            Sign Out: Acceptable/Baseline neuro status   Airway/Respiratory: Uneventful            Sign Out: Acceptable/Baseline resp. status   CV/Hemodynamics: Uneventful            Sign Out: Acceptable CV status   Other NRE: NONE   DID A NON-ROUTINE EVENT OCCUR? No         Last vitals:  Vitals:    03/09/21 0920 03/09/21 0930 03/09/21 0945   BP:  (!) 140/97 (!) 140/97   Pulse:  76 90   Resp: 8 8 26   Temp: 36.7  C (98  F)  36.7  C (98.1  F)   SpO2: 100% 100% 100%       Last vitals prior to Anesthesia Care Transfer:  CRNA VITALS  3/9/2021 0848 - 3/9/2021 0948      3/9/2021             Resp Rate (observed):  18          Electronically Signed By: Enzo Chester MD  March 9, 2021  9:58 AM

## 2021-03-09 NOTE — ANESTHESIA PREPROCEDURE EVALUATION
Anesthesia Pre-Procedure Evaluation    Patient: Maryam Grier   MRN: [id number] : 1985        Preoperative Diagnosis: Kidney stone [N20.0]   Procedure : Procedure(s):  CYSTOSCOPY, LEFT URETEROSCOPY, HOLMIUM LASER LITHOTRIPSY, LEFT STENT PLACEMENT, POSSIBLE RIGHT URETEROSCOPY, POSSIBLE RIGHT STENT PLACEMENT     Past Medical History:   Diagnosis Date     Obese      S/P primary low transverse  2018     Thyroid disease       Past Surgical History:   Procedure Laterality Date      SECTION N/A 2018    Procedure:  SECTION;  Surgeon: Gaby Beck MD;  Location:  L+D      KNEE ARTHROSCOPY, MED/LAT MENISCUS REPAIR Bilateral     bilateral meniscus repairs     [place] FRAGMENTING OF KIDNEY STONE      [place]      No Known Allergies   Social History     Tobacco Use     Smoking status: Never Smoker     Smokeless tobacco: Never Used   Substance Use Topics     Alcohol use: No      Wt Readings from Last 1 Encounters:   21 123.2 kg (271 lb 8 oz)        Anesthesia Evaluation   Pt has had prior anesthetic. Type: General.    No history of anesthetic complications       ROS/MED HX  ENT/Pulmonary:    (-) tobacco use   Neurologic:       Cardiovascular:       METS/Exercise Tolerance:     Hematologic:       Musculoskeletal:       GI/Hepatic:       Renal/Genitourinary:     (+) Nephrolithiasis ,     Endo:     (+) thyroid problem, hypothyroidism, Obesity (Class 3),     Psychiatric/Substance Use:       Infectious Disease:       Malignancy:       Other:     (-) Any chance pregnant          OUTSIDE LABS:  CBC:   Lab Results   Component Value Date    HGB 11.0 (L) 2018     BMP:   Lab Results   Component Value Date    POTASSIUM 5.1 10/09/2020    CR 0.80 10/09/2020    GLC 88 10/09/2020     COAGS: No results found for: PTT, INR, FIBR  POC:   Lab Results   Component Value Date    HCG Negative 2021     HEPATIC:   Lab Results   Component Value Date    ALT 16 10/09/2020     AST 13 10/09/2020     OTHER:   Lab Results   Component Value Date    A1C 4.7 (A) 02/10/2020    TSH 3.09 01/15/2021       Anesthesia Plan    ASA Status:  3      Anesthesia Type: General.   Induction: Intravenous.   Maintenance: Balanced.        Consents    Anesthesia Plan(s) and associated risks, benefits, and realistic alternatives discussed. Questions answered and patient/representative(s) expressed understanding.     - Discussed with:  Patient         Postoperative Care    Pain management: IV analgesics, Multi-modal analgesia.   PONV prophylaxis: Ondansetron (or other 5HT-3), Dexamethasone or Solumedrol     Comments:                Enzo Chester MD

## 2021-03-12 LAB
APPEARANCE STONE: NORMAL
COMPN STONE: NORMAL
NUMBER STONE: NORMAL
SIZE STONE: NORMAL MM
WT STONE: 137 MG

## 2021-03-23 ENCOUNTER — OFFICE VISIT (OUTPATIENT)
Dept: FAMILY MEDICINE | Facility: CLINIC | Age: 36
End: 2021-03-23
Payer: COMMERCIAL

## 2021-03-23 VITALS
TEMPERATURE: 97.9 F | WEIGHT: 270 LBS | HEART RATE: 109 BPM | HEIGHT: 67 IN | BODY MASS INDEX: 42.38 KG/M2 | OXYGEN SATURATION: 97 % | SYSTOLIC BLOOD PRESSURE: 110 MMHG | DIASTOLIC BLOOD PRESSURE: 74 MMHG | RESPIRATION RATE: 20 BRPM

## 2021-03-23 DIAGNOSIS — R30.0 DYSURIA: ICD-10-CM

## 2021-03-23 DIAGNOSIS — N30.01 ACUTE CYSTITIS WITH HEMATURIA: Primary | ICD-10-CM

## 2021-03-23 DIAGNOSIS — R82.90 NONSPECIFIC FINDING ON EXAMINATION OF URINE: ICD-10-CM

## 2021-03-23 LAB
ALBUMIN UR-MCNC: ABNORMAL MG/DL
APPEARANCE UR: CLEAR
BACTERIA #/AREA URNS HPF: ABNORMAL /HPF
BILIRUB UR QL STRIP: NEGATIVE
COLOR UR AUTO: YELLOW
GLUCOSE UR STRIP-MCNC: NEGATIVE MG/DL
HGB UR QL STRIP: ABNORMAL
KETONES UR STRIP-MCNC: NEGATIVE MG/DL
LEUKOCYTE ESTERASE UR QL STRIP: ABNORMAL
NITRATE UR QL: POSITIVE
PH UR STRIP: 6 PH (ref 5–7)
RBC #/AREA URNS AUTO: >100 /HPF
SOURCE: ABNORMAL
SP GR UR STRIP: 1.01 (ref 1–1.03)
UROBILINOGEN UR STRIP-ACNC: 0.2 EU/DL (ref 0.2–1)
WBC #/AREA URNS AUTO: >100 /HPF

## 2021-03-23 PROCEDURE — 99213 OFFICE O/P EST LOW 20 MIN: CPT | Performed by: NURSE PRACTITIONER

## 2021-03-23 PROCEDURE — 81001 URINALYSIS AUTO W/SCOPE: CPT | Performed by: NURSE PRACTITIONER

## 2021-03-23 PROCEDURE — 87086 URINE CULTURE/COLONY COUNT: CPT | Performed by: NURSE PRACTITIONER

## 2021-03-23 PROCEDURE — 87088 URINE BACTERIA CULTURE: CPT | Performed by: NURSE PRACTITIONER

## 2021-03-23 PROCEDURE — 87186 SC STD MICRODIL/AGAR DIL: CPT | Performed by: NURSE PRACTITIONER

## 2021-03-23 RX ORDER — NITROFURANTOIN 25; 75 MG/1; MG/1
100 CAPSULE ORAL 2 TIMES DAILY
Qty: 14 CAPSULE | Refills: 0 | Status: SHIPPED | OUTPATIENT
Start: 2021-03-23 | End: 2021-03-30

## 2021-03-23 ASSESSMENT — MIFFLIN-ST. JEOR: SCORE: 1942.34

## 2021-03-23 NOTE — PROGRESS NOTES
"    Assessment & Plan   Problem List Items Addressed This Visit     None      Visit Diagnoses     Acute cystitis with hematuria    -  Primary    Dysuria        Relevant Medications    nitroFURantoin macrocrystal-monohydrate (MACROBID) 100 MG capsule    Other Relevant Orders    *UA reflex to Microscopic and Culture ([place] and [facility] (except [place] and [place]) (Completed)    Urine Microscopic (Completed)    Nonspecific finding on examination of urine        Relevant Orders    Urine Culture Aerobic Bacterial (Completed)           Review of the result(s) of each unique test - lab  7 minutes spent on the date of the encounter doing chart review, history and exam, documentation and further activities as noted above       See Patient Instructions    Return in about 1 week (around 3/30/2021) for symptoms failing to improve or worsening.    Angelique Osborne CNP  [facility]    Manuel Francisco is a 35 year old who presents for the following health issues     HPI     Genitourinary - Female  Onset/Duration: 3/19/21  Description:   Painful urination (Dysuria): YES           Frequency: YES  Blood in urine (Hematuria): YES  Delay in urine (Hesitency): no  Intensity: moderate  Progression of Symptoms:  worsening  Accompanying Signs & Symptoms:  Fever/chills: no  Flank pain: YES  Nausea and vomiting: no  Vaginal symptoms: odor  Abdominal/Pelvic Pain: no  History:   History of frequent UTI s: no  History of kidney stones: YES  Sexually Active: YES  Possibility of pregnancy: No  Precipitating or alleviating factors: None  Therapies tried and outcome: Increase fluid intake        Review of Systems   Constitutional, HEENT, cardiovascular, pulmonary, GI, , musculoskeletal, neuro, skin, endocrine and psych systems are negative, except as otherwise noted.      Objective    /74   Pulse 109   Temp 97.9  F (36.6  C)   Resp 20   Ht 1.702 m (5' 7\")   Wt 122.5 kg (270 lb)   LMP 02/28/2021   " SpO2 97%   BMI 42.29 kg/m    Body mass index is 42.29 kg/m .  Physical Exam   GENERAL: healthy, alert and no distress  ABDOMEN: soft, nontender, no hepatosplenomegaly, no masses and bowel sounds normal  BACK: no CVA tenderness, no paralumbar tenderness    Results for orders placed or performed in visit on 03/23/21 (from the past 24 hour(s))   *UA reflex to Microscopic and Culture ([place] and [facility] (except [place] and [place])    Specimen: Midstream Urine   Result Value Ref Range    Color Urine Yellow     Appearance Urine Clear     Glucose Urine Negative NEG^Negative mg/dL    Bilirubin Urine Negative NEG^Negative    Ketones Urine Negative NEG^Negative mg/dL    Specific Gravity Urine 1.015 1.003 - 1.035    Blood Urine Large (A) NEG^Negative    pH Urine 6.0 5.0 - 7.0 pH    Protein Albumin Urine Trace (A) NEG^Negative mg/dL    Urobilinogen Urine 0.2 0.2 - 1.0 EU/dL    Nitrite Urine Positive (A) NEG^Negative    Leukocyte Esterase Urine Moderate (A) NEG^Negative    Source Midstream Urine    Urine Microscopic   Result Value Ref Range    WBC Urine >100 (A) OTO5^0 - 5 /HPF    RBC Urine >100 (A) OTO2^O - 2 /HPF    Bacteria Urine Moderate (A) NEG^Negative /HPF               Angelique Osborne, CNP

## 2021-03-26 LAB
BACTERIA SPEC CULT: ABNORMAL
Lab: ABNORMAL
SPECIMEN SOURCE: ABNORMAL

## 2021-04-27 ENCOUNTER — MYC MEDICAL ADVICE (OUTPATIENT)
Dept: FAMILY MEDICINE | Facility: CLINIC | Age: 36
End: 2021-04-27

## 2021-04-27 DIAGNOSIS — R05.9 COUGH: Primary | ICD-10-CM

## 2021-04-27 NOTE — TELEPHONE ENCOUNTER
Please see my chart message below     Please review and advise     Thank you     Carmen Rodriguez RN, BSN  [place] [facility]

## 2021-04-29 DIAGNOSIS — R05.9 COUGH: ICD-10-CM

## 2021-04-29 PROCEDURE — U0005 INFEC AGEN DETEC AMPLI PROBE: HCPCS | Performed by: FAMILY MEDICINE

## 2021-04-29 PROCEDURE — U0003 INFECTIOUS AGENT DETECTION BY NUCLEIC ACID (DNA OR RNA); SEVERE ACUTE RESPIRATORY SYNDROME CORONAVIRUS 2 (SARS-COV-2) (CORONAVIRUS DISEASE [COVID-19]), AMPLIFIED PROBE TECHNIQUE, MAKING USE OF HIGH THROUGHPUT TECHNOLOGIES AS DESCRIBED BY CMS-2020-01-R: HCPCS | Performed by: FAMILY MEDICINE

## 2021-04-30 NOTE — RESULT ENCOUNTER NOTE
Dear Maryam,    Here is a summary of your recent test results:  -COVID-19 Virus PCR Result =  Not Detected       For additional lab test information, labtestsonline.org is an excellent reference.    In addition, here is a list of due or overdue Health Maintenance reminders:    PAP Smear due on 10/27/2020    Please call us at [phone] (or use [employer]) to address the above recommendations if needed.           Thank you very much for trusting me and [facility].     Have a peaceful day.    Healthy regards,  Kyler Faustin MD

## 2021-10-10 ENCOUNTER — HEALTH MAINTENANCE LETTER (OUTPATIENT)
Age: 36
End: 2021-10-10

## 2022-03-26 ENCOUNTER — HEALTH MAINTENANCE LETTER (OUTPATIENT)
Age: 37
End: 2022-03-26

## 2022-09-18 ENCOUNTER — HEALTH MAINTENANCE LETTER (OUTPATIENT)
Age: 37
End: 2022-09-18

## 2023-05-06 ENCOUNTER — HEALTH MAINTENANCE LETTER (OUTPATIENT)
Age: 38
End: 2023-05-06

## 2024-02-25 ENCOUNTER — HEALTH MAINTENANCE LETTER (OUTPATIENT)
Age: 39
End: 2024-02-25

## 2024-04-23 ENCOUNTER — ANCILLARY PROCEDURE (OUTPATIENT)
Dept: MAMMOGRAPHY | Facility: CLINIC | Age: 39
End: 2024-04-23
Attending: FAMILY MEDICINE
Payer: COMMERCIAL

## 2024-04-23 DIAGNOSIS — Z12.31 VISIT FOR SCREENING MAMMOGRAM: ICD-10-CM

## 2024-04-23 PROCEDURE — 77063 BREAST TOMOSYNTHESIS BI: CPT | Mod: TC | Performed by: STUDENT IN AN ORGANIZED HEALTH CARE EDUCATION/TRAINING PROGRAM

## 2024-04-23 PROCEDURE — 77067 SCR MAMMO BI INCL CAD: CPT | Mod: TC | Performed by: STUDENT IN AN ORGANIZED HEALTH CARE EDUCATION/TRAINING PROGRAM

## 2024-04-25 ENCOUNTER — TELEPHONE (OUTPATIENT)
Dept: FAMILY MEDICINE | Facility: CLINIC | Age: 39
End: 2024-04-25
Payer: COMMERCIAL

## 2024-04-25 DIAGNOSIS — Z12.31 ENCOUNTER FOR SCREENING MAMMOGRAM FOR BREAST CANCER: Primary | ICD-10-CM

## 2024-04-25 NOTE — TELEPHONE ENCOUNTER
Lorena from [facility] scheduling is calling to request for Dr. Faustin to sign mammogram orders. Please advise.     Lorena will keep an eye out to see if order is signed or not  - she can see that patient hasn't seen Dr. Faustin in a few years, not sure if PCP is willing to still sign or not.     If further questions/concerns Breast imaging [phone]

## 2024-05-06 ENCOUNTER — HOSPITAL ENCOUNTER (OUTPATIENT)
Dept: MAMMOGRAPHY | Facility: CLINIC | Age: 39
Discharge: HOME | End: 2024-05-06
Attending: FAMILY MEDICINE
Payer: COMMERCIAL

## 2024-05-06 ENCOUNTER — HOSPITAL ENCOUNTER (OUTPATIENT)
Dept: ULTRASOUND IMAGING | Facility: CLINIC | Age: 39
Discharge: HOME | End: 2024-05-06
Attending: FAMILY MEDICINE
Payer: COMMERCIAL

## 2024-05-06 DIAGNOSIS — R92.8 ABNORMAL MAMMOGRAM: ICD-10-CM

## 2024-05-06 PROCEDURE — 77061 BREAST TOMOSYNTHESIS UNI: CPT | Mod: LT

## 2024-05-06 PROCEDURE — 76642 ULTRASOUND BREAST LIMITED: CPT | Mod: LT

## 2024-05-06 NOTE — LETTER
Maryam Grier  [id number] [facility] [address]          May 6, 2024    Date of Exam:     Dear Maryam:    Thank you for your recent visit.    Breast Imaging Result: Your breast imaging examination showed an area that we believe is benign (not cancer). We recommend that you come back again prior to your next yearly breast imaging for short term follow-up in 6 months. If you have not already scheduled this follow-up exam, please call [phone].     Your breast tissue is not dense:  Breast tissue can be either dense or not dense. Dense tissue makes it harder to find breast cancer on a mammogram and also raises the risk of developing breast cancer.  Your breast tissue is not dense. Talk to your healthcare provider about breast density, risks for breast cancer, and your individual situation.    As you know, early detection of cancer is very important. Although not all cancers are found through breast imaging it is the most accurate method for early detection. A thorough examination includes a combination of breast imaging and a physical examination by your health care professional. Therefore, if you have not had a recent physical exam of your breasts see your health care team.    A report of your breast imaging results was sent to: Nabor Faustin    Your breast imaging will become part of your medical file here at [facility] for at least 10 years. You are responsible for informing any new health care team or breast imaging facility of the date and location of this examination.    We appreciate the opportunity to participate in your health care.    Sincerely,  Brenda Mckeon MD  [facility]

## 2024-05-07 NOTE — RESULT ENCOUNTER NOTE
Dear Maryam,    Here is a summary of your recent test results:  IMPRESSION: Probably benign asymmetry. Based on its initial appearance  on the screening mammogram, a six-month follow-up mammogram is  recommended to document resolution.    BI-RADS CATEGORY: 3 - Probably Benign.       RECOMMENDED FOLLOW-UP: Short Interval Follow-up 6 Months.      In addition, here is a list of due or overdue Health Maintenance reminders:  Yearly Preventive Visit Never done  PAP Smear due on 10/27/2020  Thyroid Function Lab due on 01/15/2022  Cholesterol Lab Never done  COVID-19 Vaccine(3 - 2023-24 season) due on 09/01/2023  Blood Sugar Screening due on 10/09/2023    Please call us at [phone] (or use [employer]) to address the above recommendations if needed.           Thank you very much for trusting me and [facility].     Have a peaceful day.    Healthy regards,  Kyler Faustin MD

## 2024-05-24 DIAGNOSIS — G47.33 OSA (OBSTRUCTIVE SLEEP APNEA): Primary | ICD-10-CM

## 2024-07-14 ENCOUNTER — HEALTH MAINTENANCE LETTER (OUTPATIENT)
Age: 39
End: 2024-07-14

## 2024-10-30 NOTE — PLAN OF CARE
Pt is up independently.  Voiding adequately.  Tylenol, ibuprofen and oxycodone for pain.  Mom is breastfeeding and pumping.  Baby down to NICU overnight.  Continue to monitor.   No

## 2024-11-06 ENCOUNTER — HOSPITAL ENCOUNTER (OUTPATIENT)
Dept: MAMMOGRAPHY | Facility: CLINIC | Age: 39
Discharge: HOME | End: 2024-11-06
Attending: FAMILY MEDICINE | Admitting: FAMILY MEDICINE
Payer: COMMERCIAL

## 2024-11-06 DIAGNOSIS — R92.8 BI-RADS CATEGORY 3 MAMMOGRAM RESULT: ICD-10-CM

## 2024-11-06 PROCEDURE — 77065 DX MAMMO INCL CAD UNI: CPT | Mod: LT

## 2025-07-19 ENCOUNTER — HEALTH MAINTENANCE LETTER (OUTPATIENT)
Age: 40
End: 2025-07-19

## (undated) DEVICE — WIRE GUIDE AMPLATZ SUPER STIFF 0.035"X145CM 46-524

## (undated) DEVICE — SOL NACL 0.9% IRRIG 1000ML BOTTLE 07138-09

## (undated) DEVICE — PAD CHUX UNDERPAD 23X24" 7136

## (undated) DEVICE — BAG CYSTO TABLE DRAIN

## (undated) DEVICE — GLOVE PROTEXIS W/NEU-THERA 7.5  2D73TE75

## (undated) DEVICE — LINEN C-SECTION 5415

## (undated) DEVICE — BLADE CLIPPER 4406

## (undated) DEVICE — Device

## (undated) DEVICE — SU VICRYL 0 CT-1 27" J340H

## (undated) DEVICE — GUIDEWIRE SENSOR DUAL FLEX ANG 0.035"X150CM M0066703010

## (undated) DEVICE — RAD RX ISOVUE 300 (50ML) 61% IOPAMIDOL CHARGE PER ML

## (undated) DEVICE — LASER FIBER HOLMIUM FLEXIVA 200UM M0068403910 840-391

## (undated) DEVICE — SUCTION CANISTER MEDIVAC LINER 3000ML W/LID 65651-530

## (undated) DEVICE — GLOVE PROTEXIS BLUE W/NEU-THERA 7.0  2D73EB70

## (undated) DEVICE — PACK C-SECTION LF PL15OTA83B

## (undated) DEVICE — CATH TRAY FOLEY 16FR BARDEX W/DRAIN BAG STATLOCK 300316A

## (undated) DEVICE — SU MONOCRYL 0 CTX 36" Y398H

## (undated) DEVICE — BASKET NITINOL TIPLESS HALO  1.5FRX120CM 554120

## (undated) DEVICE — GLOVE PROTEXIS W/NEU-THERA 7.0  2D73TE70

## (undated) DEVICE — PACK CYSTOSCOPY SBA15CYFSI

## (undated) DEVICE — PREP CHLORAPREP 26ML TINTED ORANGE  260815

## (undated) DEVICE — DRSG TEGADERM 4X10" 1627

## (undated) DEVICE — ESU GROUND PAD UNIVERSAL W/O CORD

## (undated) DEVICE — CATH URETERAL DUAL LUMEN 10FRX54CM M0064051000

## (undated) RX ORDER — HYDROMORPHONE HYDROCHLORIDE 1 MG/ML
INJECTION, SOLUTION INTRAMUSCULAR; INTRAVENOUS; SUBCUTANEOUS
Status: DISPENSED
Start: 2021-03-09

## (undated) RX ORDER — FENTANYL CITRATE 50 UG/ML
INJECTION, SOLUTION INTRAMUSCULAR; INTRAVENOUS
Status: DISPENSED
Start: 2021-03-09

## (undated) RX ORDER — FENTANYL CITRATE 0.05 MG/ML
INJECTION, SOLUTION INTRAMUSCULAR; INTRAVENOUS
Status: DISPENSED
Start: 2021-03-09

## (undated) RX ORDER — ONDANSETRON 2 MG/ML
INJECTION INTRAMUSCULAR; INTRAVENOUS
Status: DISPENSED
Start: 2021-03-09

## (undated) RX ORDER — CEFAZOLIN SODIUM IN 0.9 % NACL 3 G/100 ML
INTRAVENOUS SOLUTION, PIGGYBACK (ML) INTRAVENOUS
Status: DISPENSED
Start: 2021-03-09

## (undated) RX ORDER — PROPOFOL 10 MG/ML
INJECTION, EMULSION INTRAVENOUS
Status: DISPENSED
Start: 2021-03-09